# Patient Record
Sex: MALE | Race: WHITE | Employment: FULL TIME | URBAN - METROPOLITAN AREA
[De-identification: names, ages, dates, MRNs, and addresses within clinical notes are randomized per-mention and may not be internally consistent; named-entity substitution may affect disease eponyms.]

---

## 2017-01-04 ENCOUNTER — TELEPHONE ANTICOAG (OUTPATIENT)
Dept: CARDIOLOGY CLINIC | Age: 52
End: 2017-01-04

## 2017-01-04 LAB — INR, EXTERNAL: 1.8

## 2017-01-04 NOTE — PROGRESS NOTES
Verbal order and read back per Bobbi Eduardo NP  The INR is below the therapeutic range.   Please make the following adjustments to Coumadin dosin.5 mg extra today and then continue 2.5 mg daily except 5 mg MWMARICARMEN  Repeat the INR in 1 month

## 2017-01-06 ENCOUNTER — HOSPITAL ENCOUNTER (OUTPATIENT)
Dept: LAB | Age: 52
Discharge: HOME | End: 2017-01-06
Payer: COMMERCIAL

## 2017-01-06 ENCOUNTER — OFFICE VISIT (OUTPATIENT)
Dept: CARDIOLOGY CLINIC | Age: 52
End: 2017-01-06

## 2017-01-06 VITALS
DIASTOLIC BLOOD PRESSURE: 70 MMHG | BODY MASS INDEX: 40.46 KG/M2 | HEIGHT: 71 IN | OXYGEN SATURATION: 97 % | SYSTOLIC BLOOD PRESSURE: 132 MMHG | HEART RATE: 68 BPM | WEIGHT: 289 LBS

## 2017-01-06 DIAGNOSIS — I35.9 AORTIC VALVE DISEASE: ICD-10-CM

## 2017-01-06 DIAGNOSIS — I25.10 CORONARY ARTERY DISEASE INVOLVING NATIVE CORONARY ARTERY OF NATIVE HEART WITHOUT ANGINA PECTORIS: Primary | Chronic | ICD-10-CM

## 2017-01-06 DIAGNOSIS — E78.49 OTHER HYPERLIPIDEMIA: ICD-10-CM

## 2017-01-06 DIAGNOSIS — I44.0 FIRST DEGREE AV BLOCK: ICD-10-CM

## 2017-01-06 LAB
ALBUMIN SERPL BCP-MCNC: 3.9 G/DL (ref 3.4–5)
ALBUMIN/GLOB SERPL: 1.2 {RATIO} (ref 0.8–1.7)
ALP SERPL-CCNC: 69 U/L (ref 45–117)
ALT SERPL-CCNC: 50 U/L (ref 16–61)
ANION GAP BLD CALC-SCNC: 8 MMOL/L (ref 3–18)
AST SERPL W P-5'-P-CCNC: 53 U/L (ref 15–37)
BASOPHILS # BLD AUTO: 0 K/UL (ref 0–0.1)
BASOPHILS # BLD: 0 % (ref 0–2)
BILIRUB SERPL-MCNC: 0.4 MG/DL (ref 0.2–1)
BUN SERPL-MCNC: 20 MG/DL (ref 7–18)
BUN/CREAT SERPL: 21 (ref 12–20)
CALCIUM SERPL-MCNC: 9 MG/DL (ref 8.5–10.1)
CHLORIDE SERPL-SCNC: 104 MMOL/L (ref 100–108)
CHOLEST SERPL-MCNC: 228 MG/DL
CO2 SERPL-SCNC: 26 MMOL/L (ref 21–32)
CREAT SERPL-MCNC: 0.94 MG/DL (ref 0.6–1.3)
DIFFERENTIAL METHOD BLD: ABNORMAL
EOSINOPHIL # BLD: 0.3 K/UL (ref 0–0.4)
EOSINOPHIL NFR BLD: 6 % (ref 0–5)
ERYTHROCYTE [DISTWIDTH] IN BLOOD BY AUTOMATED COUNT: 14.6 % (ref 11.6–14.5)
GLOBULIN SER CALC-MCNC: 3.3 G/DL (ref 2–4)
GLUCOSE SERPL-MCNC: 87 MG/DL (ref 74–99)
HCT VFR BLD AUTO: 42.9 % (ref 36–48)
HDLC SERPL-MCNC: 46 MG/DL (ref 40–60)
HDLC SERPL: 5 {RATIO} (ref 0–5)
HGB BLD-MCNC: 14.1 G/DL (ref 13–16)
LDLC SERPL CALC-MCNC: 144.2 MG/DL (ref 0–100)
LIPID PROFILE,FLP: ABNORMAL
LYMPHOCYTES # BLD AUTO: 43 % (ref 21–52)
LYMPHOCYTES # BLD: 2.5 K/UL (ref 0.9–3.6)
MCH RBC QN AUTO: 29.1 PG (ref 24–34)
MCHC RBC AUTO-ENTMCNC: 32.9 G/DL (ref 31–37)
MCV RBC AUTO: 88.6 FL (ref 74–97)
MONOCYTES # BLD: 0.3 K/UL (ref 0.05–1.2)
MONOCYTES NFR BLD AUTO: 6 % (ref 3–10)
NEUTS SEG # BLD: 2.7 K/UL (ref 1.8–8)
NEUTS SEG NFR BLD AUTO: 45 % (ref 40–73)
PLATELET # BLD AUTO: 226 K/UL (ref 135–420)
PMV BLD AUTO: 10.1 FL (ref 9.2–11.8)
POTASSIUM SERPL-SCNC: 4.2 MMOL/L (ref 3.5–5.5)
PROT SERPL-MCNC: 7.2 G/DL (ref 6.4–8.2)
RBC # BLD AUTO: 4.84 M/UL (ref 4.7–5.5)
SODIUM SERPL-SCNC: 138 MMOL/L (ref 136–145)
T4 FREE SERPL-MCNC: 1.1 NG/DL (ref 0.7–1.5)
TRIGL SERPL-MCNC: 189 MG/DL (ref ?–150)
TSH SERPL DL<=0.05 MIU/L-ACNC: 2.19 UIU/ML (ref 0.36–3.74)
VLDLC SERPL CALC-MCNC: 37.8 MG/DL
WBC # BLD AUTO: 6 K/UL (ref 4.6–13.2)

## 2017-01-06 PROCEDURE — 85025 COMPLETE CBC W/AUTO DIFF WBC: CPT

## 2017-01-06 PROCEDURE — 84439 ASSAY OF FREE THYROXINE: CPT

## 2017-01-06 PROCEDURE — 80061 LIPID PANEL: CPT

## 2017-01-06 PROCEDURE — 80053 COMPREHEN METABOLIC PANEL: CPT

## 2017-01-06 PROCEDURE — 36415 COLL VENOUS BLD VENIPUNCTURE: CPT

## 2017-01-06 RX ORDER — ALBUTEROL SULFATE 90 UG/1
AEROSOL, METERED RESPIRATORY (INHALATION)
COMMUNITY

## 2017-01-06 NOTE — PROGRESS NOTES
1. Have you been to the ER, urgent care clinic since your last visit? Hospitalized since your last visit? No  2. Have you seen or consulted any other health care providers outside of the [address] [place] [address] since your last visit? Include any pap smears or colon screening.   NO

## 2017-01-06 NOTE — MR AVS SNAPSHOT
Visit Information Date & Time Provider Department Dept. Phone Encounter #  
 1/6/2017  3:20 PM Jennifer Chandler MD [facility] [id number] Your Appointments 11/7/2017  4:00 PM  
Follow Up with Kayla Bledsoe MD  
[facility] ([facility]) Appt Note: 1 yr f/up per Tono [id number] [address] [id number] [phone] [address] Upcoming Health Maintenance Date Due Hepatitis C Screening 1965 DTaP/Tdap/Td series (1 - Tdap) 11/29/1985 FOBT Q 1 YEAR AGE 50-75 11/29/2014 INFLUENZA AGE 9 TO ADULT 8/1/2016 Allergies as of 1/6/2017  Review Complete On: 1/6/2017 By: Gabino Postal Severity Noted Reaction Type Reactions Hydromorphone  11/13/2014    Other (comments) Makes anxious and  Amplifies pain  
 Nsaids (Non-steroidal Anti-inflammatory Drug)  11/13/2014    Other (comments) Cannot take because he has a heart valve Current Immunizations  Never Reviewed Name Date Pneumococcal Polysaccharide (PPSV-23) 6/4/2014  8:41 AM  
  
 Not reviewed this visit You Were Diagnosed With   
  
 Codes Comments Coronary artery disease involving native coronary artery of native heart without angina pectoris    -  Primary ICD-10-CM: I25.10 ICD-9-CM: 414.01 Vitals BP Pulse Height(growth percentile) Weight(growth percentile) SpO2 BMI  
 132/70 68 5' 11\" (1.803 m) 289 lb (131.1 kg) 97% 40.31 kg/m2 Smoking Status Never Smoker Vitals History BMI and BSA Data Body Mass Index Body Surface Area  
 40.31 kg/m 2 2.56 m 2 Preferred Pharmacy Pharmacy Name Phone [id number] [address] [facility] [phone] Your Updated Medication List  
  
   
This list is accurate as of: 1/6/17  4:36 PM.  Always use your most recent med list.  
  
  
  
  
 ADVAIR DISKUS 100-50 mcg/dose diskus inhaler Generic drug:  fluticasone-salmeterol Take 1 Puff by inhalation every twelve (12) hours. 250-50 mcg instructed to use DOS  
  
 albuterol 90 mcg/actuation inhaler Commonly known as:  PROVENTIL HFA, VENTOLIN HFA, PROAIR HFA Take  by inhalation every four (4) hours as needed for Wheezing. allopurinol 300 mg tablet Commonly known as:  [place] Night Take 300 mg by mouth daily. aspirin 81 mg chewable tablet Take 81 mg by mouth daily (with breakfast). colchicine 0.6 mg tablet Take 0.6 mg by mouth two (2) times a day. losartan 50 mg tablet Commonly known as:  COZAAR  
take 1 tablet by mouth daily NASONEX 50 mcg/actuation nasal spray Generic drug:  mometasone 1 [place] by Both Nostrils route daily. pediatric multivitamins chewable tablet Take 1 Tab by mouth two (2) times a day. SYNTHROID 75 mcg tablet Generic drug:  levothyroxine Take 75 mcg by mouth Daily (before breakfast). Instructed to take DOS  
  
 tamsulosin 0.4 mg capsule Commonly known as:  FLOMAX TAKE 1 CAPSULE BY MOUTH ONCE DAILY  
  
 testosterone cypionate 200 mg/mL injection Commonly known as:  DEPOTESTOTERONE CYPIONATE  
200 mg by IntraMUSCular route every twenty-one (21) days. warfarin 5 mg tablet Commonly known as:  COUMADIN  
TAKE 1 TABLET BY MOUTH EVERY DAY OR AS DIRECTED BY OUR OFFICE We Performed the Following AMB POC EKG ROUTINE W/ 12 LEADS, INTER & REP [12938 CPT(R)] To-Do List   
 01/06/2017 Lab:  CBC WITH AUTOMATED DIFF   
  
 01/06/2017 Lab:  LIPID PANEL   
  
 01/06/2017 Lab:  METABOLIC PANEL, COMPREHENSIVE   
  
 01/06/2017 Lab:  T4   
  
 01/06/2017 Lab:  TSH 3RD GENERATION Introducing [facility]!    
 Irena Aly introduces [employer] patient portal. Now you can access parts of your medical record, email your doctor's office, and request medication refills online. 1. In your internet browser, go to [email or web address] 2. Click on the First Time User? Click Here link in the Sign In box. You will see the New Member Sign Up page. 3. Enter your [employer] Access Code exactly as it appears below. You will not need to use this code after youve completed the sign-up process. If you do not sign up before the expiration date, you must request a new code. · [employer] Access Code: [email or web address] Expires: 3/21/2017  4:08 PM 
 
4. Enter the last four digits of your Social Security Number (xxxx) and Date of Birth (mm/dd/yyyy) as indicated and click Submit. You will be taken to the next sign-up page. 5. Create a [employer] ID. This will be your [employer] login ID and cannot be changed, so think of one that is secure and easy to remember. 6. Create a [employer] password. You can change your password at any time. 7. Enter your Password Reset Question and Answer. This can be used at a later time if you forget your password. 8. Enter your e-mail address. You will receive e-mail notification when new information is available in [address] 9. Click Sign Up. You can now view and download portions of your medical record. 10. Click the Download Summary menu link to download a portable copy of your medical information. If you have questions, please visit the Frequently Asked Questions section of the [employer] website. Remember, [employer] is NOT to be used for urgent needs. For medical emergencies, dial 911. Now available from your iPhone and Android! Please provide this summary of care documentation to your next provider. Your primary care clinician is listed as [address]. If you have any questions after today's visit, please call [phone].

## 2017-01-07 ENCOUNTER — HOSPITAL ENCOUNTER (EMERGENCY)
Age: 52
Discharge: HOME | End: 2017-01-07
Attending: EMERGENCY MEDICINE
Payer: COMMERCIAL

## 2017-01-07 VITALS
DIASTOLIC BLOOD PRESSURE: 94 MMHG | RESPIRATION RATE: 20 BRPM | WEIGHT: 278 LBS | TEMPERATURE: 98.4 F | HEIGHT: 71 IN | SYSTOLIC BLOOD PRESSURE: 166 MMHG | BODY MASS INDEX: 38.92 KG/M2 | OXYGEN SATURATION: 98 % | HEART RATE: 81 BPM

## 2017-01-07 DIAGNOSIS — G51.0 BELL'S PALSY: Primary | ICD-10-CM

## 2017-01-07 PROCEDURE — 74011636637 HC RX REV CODE- 636/637: Performed by: EMERGENCY MEDICINE

## 2017-01-07 PROCEDURE — 99283 EMERGENCY DEPT VISIT LOW MDM: CPT

## 2017-01-07 RX ORDER — PREDNISONE 20 MG/1
20 TABLET ORAL DAILY
Qty: 5 TAB | Refills: 0 | Status: SHIPPED | OUTPATIENT
Start: 2017-01-07 | End: 2017-01-12

## 2017-01-07 RX ORDER — PREDNISONE 20 MG/1
60 TABLET ORAL
Status: COMPLETED | OUTPATIENT
Start: 2017-01-07 | End: 2017-01-07

## 2017-01-07 RX ADMIN — PREDNISONE 60 MG: 20 TABLET ORAL at 13:00

## 2017-01-07 NOTE — DISCHARGE INSTRUCTIONS
Bell's Palsy: Care Instructions  Your Care Instructions    Bell's palsy is paralysis or weakness of the muscles on one side of the face. Often people with Bell's palsy have a droop on one side of the mouth and have trouble completely shutting the eye on the same side. Bell's palsy can also interfere with the sense of taste. These things happen when a nerve in your face becomes inflamed. Bell's palsy is not caused by a stroke. The cause of the nerve inflammation is not known. But some experts think that a virus may cause it. Because of this, doctors sometimes prescribe antiviral medicine to treat it. You also may get medicine to reduce swelling. Bell's palsy usually gets better on its own in a few weeks or months. Follow-up care is a key part of your treatment and safety. Be sure to make and go to all appointments, and call your doctor if you are having problems. It's also a good idea to know your test results and keep a list of the medicines you take. How can you care for yourself at home? · Take your medicines exactly as prescribed. Call your doctor if you think you are having a problem with your medicine. You will get more details on the specific medicines your doctor prescribes. · Use artificial tears or ointment if your eyes are too dry. Bell's palsy can make your lower eyelid droop, causing a dry eye. · If you cannot completely close your eye, consider using an eye patch while you sleep. · Help yourself blink by using your finger to close and open your eyelid. This may help keep your eye moist.  · Wear glasses or goggles to keep dust and dirt out of your eye. · As feeling comes back to your face, massage your forehead, cheeks, and lips. Massage may make the muscles in your face stronger. · Brush and floss your teeth often to help prevent tooth decay. Bell's palsy can dry up the spit on one side of your mouth. This increases the risk of tooth decay. When should you call for help?   Call 911 anytime you think you may need emergency care. For example, call if:  · You have symptoms of a stroke. These may include:  ¨ Sudden numbness, tingling, weakness, or loss of movement in your face, arm, or leg, especially on only one side of your body. ¨ Sudden vision changes. ¨ Sudden trouble speaking. ¨ Sudden confusion or trouble understanding simple statements. ¨ Sudden problems with walking or balance. ¨ A sudden, severe headache that is different from past headaches. Call your doctor now or seek immediate medical care if:  · You have numbness or weakness that spreads beyond one side of your face. · You have a skin rash or eye pain or redness, or light bothers your eyes. · You have a new or worse headache. Watch closely for changes in your health, and be sure to contact your doctor if:  · You do not get better as expected. Where can you learn more? Go to http://teodora-bennie.info/. Enter P168 in the search box to learn more about \"Bell's Palsy: Care Instructions. \"  Current as of: February 19, 2016  Content Version: 11.1  © 9387-1292 [employer]. Care instructions adapted under license by [employer] (which disclaims liability or warranty for this information). If you have questions about a medical condition or this instruction, always ask your healthcare professional. [address] any warranty or liability for your use of this information.

## 2017-01-07 NOTE — ED TRIAGE NOTES
Onset two days ago of left facial droop and some ear pain. Noted to have mouth droop at arrival. Denies other weakness.

## 2017-01-07 NOTE — ED NOTES
Pt states ready for discharge. Pt states he will follow up with PCP as instructed by provider. Pt appears in NOAD. I have reviewed discharge instructions with the patient. Prescriptions were reviewed with patient instructed not to drink alcohol, drive a car, or operate heavy machinery while taking this medicine. The patient verbalized understanding. Patient seen leaving ED ambulatory without difficulty or need for assistance, with S/O in no sign of distress. Patient armband removed and shredded. Discharge Medication List as of 1/7/2017  1:05 PM      START taking these medications    Details   predniSONE (DELTASONE) 20 mg tablet Take 1 Tab by mouth daily for 5 days. , Print, Disp-5 Tab, R-0         CONTINUE these medications which have NOT CHANGED    Details   albuterol (PROVENTIL HFA, VENTOLIN HFA, PROAIR HFA) 90 mcg/actuation inhaler Take  by inhalation every four (4) hours as needed for Wheezing., Historical Med      tamsulosin (FLOMAX) 0.4 mg capsule TAKE 1 CAPSULE BY MOUTH ONCE DAILY, Normal, Disp-90 Cap, R-3      warfarin (COUMADIN) 5 mg tablet TAKE 1 TABLET BY MOUTH EVERY DAY OR AS DIRECTED BY OUR OFFICE, Normal**Patient requests 90 days supply**Disp-135 Tab, R-1      losartan (COZAAR) 50 mg tablet take 1 tablet by mouth daily, NormalPatient needs a small supply until his mail order arrivesDisp-10 Tab, R-1      pediatric multivitamins chewable tablet Take 1 Tab by mouth two (2) times a day., Historical Med      aspirin 81 mg chewable tablet Take 81 mg by mouth daily (with breakfast). , Historical Med      allopurinol (ZYLOPRIM) 300 mg tablet Take 300 mg by mouth daily. , Historical Med      mometasone (NASONEX) 50 mcg/actuation nasal spray 1 [place] by Both Nostrils route daily. , Historical Med      testosterone cypionate (DEPOTESTOTERONE CYPIONATE) 200 mg/mL injection 200 mg by IntraMUSCular route every twenty-one (21) days. , Historical Med      fluticasone-salmeterol (ADVAIR DISKUS) 100-50 mcg/dose diskus inhaler Take 1 Puff by inhalation every twelve (12) hours. 250-50 mcg instructed to use DOS, Historical Med      levothyroxine (SYNTHROID) 75 mcg tablet Take 75 mcg by mouth Daily (before breakfast).  Instructed to take DOS, Historical Med      colchicine 0.6 mg tablet Take 0.6 mg by mouth two (2) times a day., Historical Med

## 2017-01-07 NOTE — ED PROVIDER NOTES
HPI Comments: 12:47 PM Riley Delgadillo is a 45 y.o. male with h/o HTN, CAD, CABG X 1 and gastric bypass who presents to ED complaining of L sided facial numbness and weakness onset two days ago. He states \"The L side of my mouth was numb two days ago. The numbness moved to under my nose and L cheek yesterday. \" Wife notes a L sided facial droop. Pt also complains of bilateral ear pain. Pt denies any CP, SOB or NV. No other concerns nor complaints at this time. PCP: Bogdan Shetty MD      The history is provided by the patient and the spouse. Past Medical History:   Diagnosis Date    Allergic asthma     Allergic rhinitis     Anticoagulated on Coumadin     CABG x 1 10/31/2007     SVG to OM and aortic valve replacement by Dr. Toney BLACKMAN (coronary artery disease)     Cardiac cath 08/24/2007     mRCA 30%. LM patent. LAD patent. mCX 45%. LVEDP 40.  EF 35%. LVE. Global hypk. 2+ AI. RA 11.  RV 44/8. PA 45/22. W 34.  CO/CI 7.4/3.0 (TD); 6.8/2.7 ([facility]).  Cardiac echocardiogram 07/15/2014     EF normal.  Septal WMA c/w past CABG. LVH. Mild LAE. Mild JUVENAL. Prosthetic AV w/normal function (mean grad 11 mmHg).  Cardiac nuclear imaging test, mod risk 04/14/2014     Patchy radiotracer uptake. Sm, fixed basal inferior defect most c/w artifact. EF 43%. Mild septal hypk c/w past CABG. Mild mid anterior hypk. Neg EKG on pharm stress test.  Intermediate risk.  Difficulty in voiding      Dr. Erlinda Conde; On Tamsulosin    Gout     History of aortic valve disease     Hypertension     Hypogonadism, male      Dr. Erlinda Conde; On TRT via IM injection of testosterone 200 mg every 3 weeks       Hypothyroidism     Left ventricular dilatation      with lower limits of normal left ventricular systolic function.     Obesity, Class II, BMI 35-39.9 (HCC)     Obstructive sleep apnea on CPAP     Osteoarthritis of both knees 6/1/2014    Postoperative anemia due to acute blood loss 6/2/2014    S/P aortic valve replacement 10/31/2007     Dr. Shruthi Baker S/P gastric bypass 4/2009     S/P Gastric bypass (4/2009 - Dr. Josiane Anand)    Vitamin D deficiency 6/6/2014     Vitamin D 25-Hydroxy 19.9       Past Surgical History:   Procedure Laterality Date    Pr gastric bypass,obesity,w/sm bowel recons  4/2009     Dr. Josiane Anand; 345 pounds with target weight of 200 pounds.  Hx aortic valve replacement  10/31/2007     Dr. Shruthi Baker Hx heent       S/P Rhinoplasty    Hx lumbar laminectomy  1990    Hx gastric bypass  2009    Hx knee arthroscopy Right      2x    Hx knee replacement Bilateral 6/02/2014     S/P Bilateral total knee replacement (6/02/2014 - Dr. Val Christian)    Hx coronary artery bypass graft  10/31/2007     Dr. Nasim Anders (SVG to OM)         Family History:   Problem Relation Age of Onset    Cancer Mother      Lung cancer    Heart Disease Mother     Lung Disease Mother     Depression Mother     High Cholesterol Mother     Heart Disease Father     High Cholesterol Father        Social History     Social History    Marital status:      Spouse name: N/A    Number of children: N/A    Years of education: N/A     Occupational History    Not on file. Social History Main Topics    Smoking status: Never Smoker    Smokeless tobacco: Never Used    Alcohol use No    Drug use: Not on file    Sexual activity: Not on file     Other Topics Concern    Not on file     Social History Narrative         ALLERGIES: Hydromorphone and Nsaids (non-steroidal anti-inflammatory drug)    Review of Systems   Constitutional: Negative for chills, fatigue, fever and unexpected weight change. HENT: Positive for ear pain (bilateral). Negative for congestion and rhinorrhea. Respiratory: Negative for chest tightness and shortness of breath. Cardiovascular: Negative for chest pain, palpitations and leg swelling.    Gastrointestinal: Negative for abdominal pain, nausea and vomiting. Genitourinary: Negative for dysuria. Musculoskeletal: Negative for back pain. Skin: Negative for rash. Neurological: Positive for facial asymmetry (L facial), weakness (L facial) and numbness (L facial). Negative for dizziness. Psychiatric/Behavioral: The patient is not nervous/anxious. All other systems reviewed and are negative. Vitals:    01/07/17 1245 01/07/17 1248   BP:  (!) 166/94   Pulse: 81    Resp: 20    Temp: 98.4 °F (36.9 °C)    SpO2: 98%    Weight:  126.1 kg (278 lb)   Height:  5' 11\" (1.803 m)            Physical Exam   Constitutional: He is oriented to person, place, and time. He appears well-developed and well-nourished. No distress. HENT:   Head: Normocephalic and atraumatic. Right Ear: External ear normal.   Left Ear: External ear normal.   Nose: Nose normal.   Mouth/Throat: Oropharynx is clear and moist.   Eyes: Conjunctivae and EOM are normal. Pupils are equal, round, and reactive to light. No scleral icterus. Neck: Normal range of motion. Neck supple. No JVD present. No tracheal deviation present. No thyromegaly present. Cardiovascular: Normal rate, regular rhythm, normal heart sounds and intact distal pulses. Exam reveals no gallop and no friction rub. No murmur heard. Pulmonary/Chest: Effort normal and breath sounds normal. He exhibits no tenderness. Abdominal: Soft. Bowel sounds are normal. He exhibits no distension. There is no tenderness. There is no rebound and no guarding. Musculoskeletal: Normal range of motion. He exhibits no edema or tenderness. Lymphadenopathy:     He has no cervical adenopathy. Neurological: He is alert and oriented to person, place, and time. A cranial nerve deficit is present. Coordination normal.   L facial nerve palsy that includes the forehead. C/W peripheral nerve compression. Skin: Skin is warm and dry. Psychiatric: He has a normal mood and affect.  His behavior is normal. Judgment and thought content normal.   Nursing note and vitals reviewed. [facility]  ED Course       Procedures  Vitals:  Patient Vitals for the past 12 hrs:   Temp Pulse Resp BP SpO2   01/07/17 1248 - - - (!) 166/94 -   01/07/17 1245 98.4 °F (36.9 °C) 81 20 - 98 %         Medications ordered:   Medications - No data to display      Lab findings:  No results found for this or any previous visit (from the past 12 hour(s)). Disposition:  Diagnosis: No diagnosis found. Disposition: discharged     Follow-up Information     None           Patient's Medications   Start Taking    No medications on file   Continue Taking    ALBUTEROL (PROVENTIL HFA, VENTOLIN HFA, PROAIR HFA) 90 MCG/ACTUATION INHALER    Take  by inhalation every four (4) hours as needed for Wheezing. ALLOPURINOL (ZYLOPRIM) 300 MG TABLET    Take 300 mg by mouth daily. ASPIRIN 81 MG CHEWABLE TABLET    Take 81 mg by mouth daily (with breakfast). COLCHICINE 0.6 MG TABLET    Take 0.6 mg by mouth two (2) times a day. FLUTICASONE-SALMETEROL (ADVAIR DISKUS) 100-50 MCG/DOSE DISKUS INHALER    Take 1 Puff by inhalation every twelve (12) hours. 250-50 mcg instructed to use DOS    LEVOTHYROXINE (SYNTHROID) 75 MCG TABLET    Take 75 mcg by mouth Daily (before breakfast). Instructed to take DOS    LOSARTAN (COZAAR) 50 MG TABLET    take 1 tablet by mouth daily    MOMETASONE (NASONEX) 50 MCG/ACTUATION NASAL SPRAY    1 [place] by Both Nostrils route daily. PEDIATRIC MULTIVITAMINS CHEWABLE TABLET    Take 1 Tab by mouth two (2) times a day. TAMSULOSIN (FLOMAX) 0.4 MG CAPSULE    TAKE 1 CAPSULE BY MOUTH ONCE DAILY    TESTOSTERONE CYPIONATE (DEPOTESTOTERONE CYPIONATE) 200 MG/ML INJECTION    200 mg by IntraMUSCular route every twenty-one (21) days.     WARFARIN (COUMADIN) 5 MG TABLET    TAKE 1 TABLET BY MOUTH EVERY DAY OR AS DIRECTED BY OUR OFFICE   These Medications have changed    No medications on file   Stop Taking    No medications on file     Scribe Attestation  Laney Bauman scribing for and in the presence of Chacho Garcia MD (01/07/17/ 12:46 PM)    Physician Attestation  I personally performed the services described in this documentation, reviewed, and edited the documentation which was dictated to the scribe in my presence, and it accurately records my own words and actions.      Chacho Garcia MD (01/07/17/ 12:46 PM)    Signed by: Clotilde Sullivan, 01/07/17, 12:46 PM

## 2017-01-09 NOTE — PROGRESS NOTES
PATIENT NAME: Mirza Bell         45 y.o.      1965              DATE:1/6/2017    REASON FOR VISIT:aortic valve replacement. Coronary artery disease. HISTORY OF PRESENT ILLNESS:Status post aortic valve replacement. Mechanical valve. Chronic anticoagulation. Coronary artery bypass surgery. History of hypertension. Blood pressure is controlled. Hyperlipidemia. Lipids have not been tested. He denies chest pain, dyspnea on exertion, orthopnea, paroxysmal nocturnal dyspnea. Denies palpitation, syncope, presyncope. PAST MEDICAL HISTORY:   Past Medical History: Allergic asthma                                               Allergic rhinitis                                             Anticoagulated on Coumadin                                    CABG x 1                                        10/31/2007      Comment:SVG to OM and aortic valve replacement by Dr. Liliya Moy    CAD (coronary artery disease)                                 Cardiac cath                                    08/24/2007      Comment:mRCA 30%. LM patent. LAD patent. mCX 45%. LVEDP 40.  EF 35%. LVE. Global hypk. 2+ AI. RA 11.  RV 44/8. PA 45/22. W 34.  CO/CI                7.4/3.0 (TD); 6.8/2.7 ([facility]). Cardiac echocardiogram                          07/15/2014      Comment:EF normal.  Septal WMA c/w past CABG. LVH. Mild LAE. Mild JUVENAL. Prosthetic AV w/normal                function (mean grad 11 mmHg). Cardiac nuclear imaging test, mod risk          04/14/2014      Comment:Patchy radiotracer uptake. Sm, fixed basal                inferior defect most c/w artifact. EF 43%. Mild septal hypk c/w past CABG. Mild mid                anterior hypk. Neg EKG on pharm stress test.                 Intermediate risk. Difficulty in voiding                                           Comment:Dr. Lulú Ontiveros;  On Tamsulosin Gout                                                          History of aortic valve disease                               Hypertension                                                  Hypogonadism, male                                              Comment:Dr. Katelynn Dhillon; On TRT via IM injection of                testosterone 200 mg every 3 weeks       Hypothyroidism                                                Left ventricular dilatation                                     Comment:with lower limits of normal left ventricular                systolic function. Obesity, Class II, BMI 35-39.9 (HCC)                          Obstructive sleep apnea on CPAP                               Osteoarthritis of both knees                    6/1/2014      Postoperative anemia due to acute blood loss    6/2/2014      S/P aortic valve replacement                    10/31/2007      Comment:Dr. Francheska Greenfield    S/P gastric bypass                              4/2009          Comment:S/P Gastric bypass (4/2009 - Dr. Ru Bean)    Vitamin D deficiency                            6/6/2014        Comment:Vitamin D 25-Hydroxy 19.9    PAST SURGICAL HISTORY:   Past Surgical History:    DE GASTRIC BYPASS,OBESITY,W/SM BOWEL RECONS      4/2009          Comment:Dr. Ru Bean; 345 pounds with target                weight of 200 pounds.     HX AORTIC VALVE REPLACEMENT                      10/31/2007      Comment:Dr. Francheska Greenfield    HX HEENT                                                         Comment:S/P Rhinoplasty    HX LUMBAR LAMINECTOMY                            1990          HX GASTRIC BYPASS                                2009          HX KNEE ARTHROSCOPY                             Right                 Comment:2x    HX KNEE REPLACEMENT                             Bilateral 6/02/2014       Comment:S/P Bilateral total knee replacement (6/02/2014               - Dr. Katie Castro)    HX CORONARY ARTERY BYPASS GRAFT 10/31/2007      Comment:Dr. Uyen Kowalski (SVG to OM)      SOCIAL HISTORY:  Social History    Marital status:              Spouse name:                       Years of education:                 Number of children:               Social History Main Topics    Smoking status: Never Smoker                                                                Smokeless status: Never Used                        Alcohol use: No                ALLERGIES:    -- Hydromorphone -- Other (comments)    --  Makes anxious and  Amplifies pain   -- Nsaids (Non-Steroidal Anti-Inflammatory Drug) -- Other (comments)    --  Cannot take because he has a heart valve     CURRENT MEDICATIONS:   Current Outpatient Prescriptions:  albuterol (PROVENTIL HFA, VENTOLIN HFA, PROAIR HFA) 90 mcg/actuation inhaler, Take  by inhalation every four (4) hours as needed for Wheezing. warfarin (COUMADIN) 5 mg tablet, TAKE 1 TABLET BY MOUTH EVERY DAY OR AS DIRECTED BY OUR OFFICE  losartan (COZAAR) 50 mg tablet, take 1 tablet by mouth daily  aspirin 81 mg chewable tablet, Take 81 mg by mouth daily (with breakfast). allopurinol (ZYLOPRIM) 300 mg tablet, Take 300 mg by mouth daily. levothyroxine (SYNTHROID) 75 mcg tablet, Take 75 mcg by mouth Daily (before breakfast). Instructed to take DOS  colchicine 0.6 mg tablet, Take 0.6 mg by mouth two (2) times a day. predniSONE (DELTASONE) 20 mg tablet, Take 1 Tab by mouth daily for 5 days. tamsulosin (FLOMAX) 0.4 mg capsule, TAKE 1 CAPSULE BY MOUTH ONCE DAILY  pediatric multivitamins chewable tablet, Take 1 Tab by mouth two (2) times a day. mometasone (NASONEX) 50 mcg/actuation nasal spray, 1 [place] by Both Nostrils route daily. testosterone cypionate (DEPOTESTOTERONE CYPIONATE) 200 mg/mL injection, 200 mg by IntraMUSCular route every twenty-one (21) days. fluticasone-salmeterol (ADVAIR DISKUS) 100-50 mcg/dose diskus inhaler, Take 1 Puff by inhalation every twelve (12) hours.  250-50 mcg instructed to use DOS    No current facility-administered medications for this visit. REVIEW of SYSTEMS:History obtained from chart review and the patient  General ROS: 39 pounds weight gain since 2/16  Psychological ROS: negative for - anxiety or depression  Hematological and Lymphatic ROS: negative for - bleeding problems  Respiratory ROS: Please see history of present illness  Cardiovascular ROS: Please see history of present illness  Gastrointestinal ROS: no abdominal pain, change in bowel habits, or black or bloody stools  Neurological ROS: no TIA or stroke symptoms     PHYSICAL EXAMINATION:   /70  Pulse 68  Ht 5' 11\" (1.803 m)  Wt 131.1 kg (289 lb)  SpO2 97%  BMI 40.31 kg/m2  BP Readings from Last 3 Encounters:  01/07/17 : (!) 166/94  01/06/17 : 132/70  02/27/16 : 149/79    Pulse Readings from Last 3 Encounters:  01/07/17 : 81  01/06/17 : 68  02/27/16 : 92    Wt Readings from Last 3 Encounters:  01/07/17 : 126.1 kg (278 lb)  01/06/17 : 131.1 kg (289 lb)  02/27/16 : 113.4 kg (250 lb)    Gen. : Obese white male NAD. HEENT: Sclera clear. Mucous membranes pink and moist.  Neck: No jugular venous distention. Carotid upstrokes 2+ without bruits. Chest: Clear to percussion and auscultation. Heart: PMI not palpable. Regular rhythm. Good valve sounds. Faint systolic ejection murmur at the base. No gallop. Abdomen: Nontender without masses or organomegaly. Extremities: No edema. Dorsalis pedis and posterior tibial pulses 2+. Skin: Warm and dry. No stasis changes. Neuro: Alert, oriented, speech WNL, no facial asymmetry. Gait WNL. EKG: First degree AV block    IMPRESSION:   Status post aortic valve replacement, asymptomatic.   Coronary artery disease, status post coronary artery bypass surgery, asymptomatic  Hyperlipidemia  Hypertension  Obesity  First degree AV block    PLAN:  Lipid profile  Comprehensive metabolic profile  TSH  CBC  Return to office in one year    The diagnoses and plan were discussed with patient. All questions answered. Plan of care agreed to by all concerned. Isauro Almazan.  MD Nieves       ,

## 2017-01-22 NOTE — PROGRESS NOTES
His LDL cholesterol is too high. He really should be taking a statin if he can tolerate it. Has he had any experience with cholesterol-lowering medications?   Please find out and let me know

## 2017-01-23 ENCOUNTER — TELEPHONE (OUTPATIENT)
Dept: CARDIOLOGY CLINIC | Age: 52
End: 2017-01-23

## 2017-01-23 NOTE — TELEPHONE ENCOUNTER
----- Message from Elma Almaraz MD sent at 1/22/2017  2:15 PM EST -----  His LDL cholesterol is too high. He really should be taking a statin if he can tolerate it. Has he had any experience with cholesterol-lowering medications?   Please find out and let me know

## 2017-01-23 NOTE — TELEPHONE ENCOUNTER
This has been fully explained to the patient, who indicates understanding. Patient open to medication for cholesterol, will follow up with PCP and Dr. Marianne Maldonado for further evaluation.

## 2017-02-02 ENCOUNTER — TELEPHONE (OUTPATIENT)
Dept: CARDIOLOGY CLINIC | Age: 52
End: 2017-02-02

## 2017-02-02 NOTE — TELEPHONE ENCOUNTER
Received a notice from [facility] that patient is past due for INR testing. Patient was contacted via phone and asked to perform testing. Patient states he contacted [employer] yesterday to inform the company that he is experiencing an error code on the machine. He is waiting for technical support to contact him or mail another machine. Patient will call the office when he get home from work today with further information.  Yannick Omalley LPN

## 2017-02-17 RX ORDER — LOSARTAN POTASSIUM 50 MG/1
TABLET ORAL
Qty: 10 TAB | Refills: 1 | Status: SHIPPED | OUTPATIENT
Start: 2017-02-17 | End: 2018-04-05 | Stop reason: SDUPTHER

## 2017-02-20 LAB — INR, EXTERNAL: 2

## 2017-02-23 ENCOUNTER — TELEPHONE ANTICOAG (OUTPATIENT)
Dept: CARDIOLOGY CLINIC | Age: 52
End: 2017-02-23

## 2017-02-23 DIAGNOSIS — Z79.01 LONG TERM CURRENT USE OF ANTICOAGULANT THERAPY: ICD-10-CM

## 2017-02-23 DIAGNOSIS — Z95.2 S/P AORTIC VALVE REPLACEMENT: ICD-10-CM

## 2017-02-23 NOTE — PROGRESS NOTES
Verbal order and read back per Andree Sweeney NP  The INR is stable and therapeutic.  Continue same dose of coumadin and recheck in 1 week  Continue Coumadin 2.5mg daily except 5mg on Mon, Wed, and Fri (only call with dose adjustments)  Gabriela Colvin LPN

## 2017-03-23 RX ORDER — LOSARTAN POTASSIUM 50 MG/1
TABLET ORAL
Qty: 90 TAB | Refills: 3 | OUTPATIENT
Start: 2017-03-23

## 2017-04-04 LAB — INR, EXTERNAL: 2.2

## 2017-04-06 ENCOUNTER — TELEPHONE ANTICOAG (OUTPATIENT)
Dept: CARDIOLOGY CLINIC | Age: 52
End: 2017-04-06

## 2017-04-06 DIAGNOSIS — Z95.2 S/P AORTIC VALVE REPLACEMENT: ICD-10-CM

## 2017-04-06 DIAGNOSIS — Z79.01 LONG TERM CURRENT USE OF ANTICOAGULANT THERAPY: ICD-10-CM

## 2017-04-06 NOTE — PROGRESS NOTES
Verbal order and read back per Frederick Banda NP  The INR is stable and therapeutic.  Continue same dose of coumadin and recheck in 1 week  Continue Coumadin 2.5mg daily except 5mg on Mon, Wed, and Fri (only call with dose adjustments)  Rohini Phipps LPN

## 2017-04-16 LAB — INR, EXTERNAL: 2.2

## 2017-04-18 ENCOUNTER — TELEPHONE ANTICOAG (OUTPATIENT)
Dept: CARDIOLOGY CLINIC | Age: 52
End: 2017-04-18

## 2017-04-18 DIAGNOSIS — Z79.01 LONG TERM CURRENT USE OF ANTICOAGULANT THERAPY: ICD-10-CM

## 2017-04-18 DIAGNOSIS — Z95.2 S/P AORTIC VALVE REPLACEMENT: ICD-10-CM

## 2017-04-18 NOTE — PROGRESS NOTES
Verbal order and read back per Sara Morales NP  The INR is stable and therapeutic.  Continue same dose of coumadin and recheck in 1 week  Continue Coumadin 2.5mg daily except 5mg on Mon, Wed, and Fri (only call with dose adjustments)  Patient informed of instructions, read back and verbalized understanding Mera Kearney LPN

## 2017-05-09 ENCOUNTER — TELEPHONE ANTICOAG (OUTPATIENT)
Dept: CARDIOLOGY CLINIC | Age: 52
End: 2017-05-09

## 2017-05-09 DIAGNOSIS — Z79.01 LONG TERM CURRENT USE OF ANTICOAGULANT THERAPY: ICD-10-CM

## 2017-05-09 DIAGNOSIS — Z95.2 S/P AORTIC VALVE REPLACEMENT: ICD-10-CM

## 2017-05-09 LAB — INR, EXTERNAL: 2.3

## 2017-05-09 NOTE — PROGRESS NOTES
Verbal order and read back per Raj Sanchez NP  The INR is stable and therapeutic.  Continue same dose of coumadin and recheck in 1 week  Continue Coumadin 2.5mg daily except 5mg on Mon, Wed, and Fri (only call with dose adjustments)  Kyle Galindo LPN

## 2017-05-23 ENCOUNTER — TELEPHONE ANTICOAG (OUTPATIENT)
Dept: CARDIOLOGY CLINIC | Age: 52
End: 2017-05-23

## 2017-05-23 DIAGNOSIS — Z79.01 LONG TERM CURRENT USE OF ANTICOAGULANT THERAPY: ICD-10-CM

## 2017-05-23 DIAGNOSIS — Z95.2 S/P AORTIC VALVE REPLACEMENT: ICD-10-CM

## 2017-05-23 LAB — INR, EXTERNAL: 2.2

## 2017-05-23 NOTE — PROGRESS NOTES
Verbal order and read back per Nickie Resendez NP  The INR is stable and therapeutic.  Continue same dose of coumadin and recheck in 1 week  Continue Coumadin 2.5mg daily except 5mg on Mon, Wed, and Fri (only call with dose adjustments)  Pdady Mccormick LPN

## 2017-06-15 LAB — INR, EXTERNAL: 2.3

## 2017-06-20 ENCOUNTER — TELEPHONE ANTICOAG (OUTPATIENT)
Dept: CARDIOLOGY CLINIC | Age: 52
End: 2017-06-20

## 2017-06-20 DIAGNOSIS — Z95.2 S/P AORTIC VALVE REPLACEMENT: ICD-10-CM

## 2017-06-20 DIAGNOSIS — Z79.01 LONG TERM CURRENT USE OF ANTICOAGULANT THERAPY: ICD-10-CM

## 2017-06-20 NOTE — PROGRESS NOTES
Verbal order and read back per Dave Cazares NP  The INR is stable and therapeutic.  Continue same dose of coumadin and recheck in 1 week  Continue Coumadin 2.5mg daily except 5mg on Mon, Wed, and Fri (only call with dose adjustments) - insurance requires weekly testing  Mera Kearney LPN

## 2017-06-26 LAB — INR, EXTERNAL: 2.3

## 2017-07-08 LAB
INR, EXTERNAL: 2.3
INR, EXTERNAL: 3.1

## 2017-07-10 ENCOUNTER — TELEPHONE ANTICOAG (OUTPATIENT)
Dept: CARDIOLOGY CLINIC | Age: 52
End: 2017-07-10

## 2017-07-10 DIAGNOSIS — Z95.2 S/P AORTIC VALVE REPLACEMENT: ICD-10-CM

## 2017-07-10 DIAGNOSIS — Z79.01 LONG TERM CURRENT USE OF ANTICOAGULANT THERAPY: ICD-10-CM

## 2017-08-09 LAB — INR, EXTERNAL: 2.4

## 2017-08-10 ENCOUNTER — TELEPHONE ANTICOAG (OUTPATIENT)
Dept: CARDIOLOGY CLINIC | Age: 52
End: 2017-08-10

## 2017-08-10 DIAGNOSIS — Z79.01 LONG TERM CURRENT USE OF ANTICOAGULANT THERAPY: ICD-10-CM

## 2017-08-10 DIAGNOSIS — Z95.2 S/P AORTIC VALVE REPLACEMENT: ICD-10-CM

## 2017-08-10 NOTE — PROGRESS NOTES
Verbal order and read back per Patrick Armendariz NP  The INR is stable and therapeutic.  Continue same dose of coumadin and recheck in 1 week  Continue Coumadin 2.5mg daily except 5mg on Mon, Wed, and Fri (only call with dose adjustments) - insurance requires weekly testing  John Argueta LPN

## 2017-08-23 LAB — INR, EXTERNAL: 2.3

## 2017-08-24 ENCOUNTER — TELEPHONE ANTICOAG (OUTPATIENT)
Dept: CARDIOLOGY CLINIC | Age: 52
End: 2017-08-24

## 2017-08-24 DIAGNOSIS — Z79.01 LONG TERM CURRENT USE OF ANTICOAGULANT THERAPY: ICD-10-CM

## 2017-08-24 DIAGNOSIS — Z95.2 S/P AORTIC VALVE REPLACEMENT: ICD-10-CM

## 2017-08-24 NOTE — PROGRESS NOTES
Verbal order and read back per Alma Aguila NP  The INR is stable and therapeutic.  Continue same dose of coumadin and recheck in 1 week  Continue Coumadin 2.5mg daily except 5mg on Mon, Wed, and Fri (only call with dose adjustments)   Francisco Dorman LPN

## 2017-11-07 ENCOUNTER — OFFICE VISIT (OUTPATIENT)
Dept: CARDIOLOGY CLINIC | Age: 52
End: 2017-11-07

## 2017-11-07 VITALS
HEIGHT: 71 IN | DIASTOLIC BLOOD PRESSURE: 88 MMHG | BODY MASS INDEX: 40.74 KG/M2 | OXYGEN SATURATION: 98 % | HEART RATE: 67 BPM | WEIGHT: 291 LBS | SYSTOLIC BLOOD PRESSURE: 170 MMHG

## 2017-11-07 DIAGNOSIS — Z95.2 S/P AORTIC VALVE REPLACEMENT: ICD-10-CM

## 2017-11-07 DIAGNOSIS — E55.9 VITAMIN D DEFICIENCY: Chronic | ICD-10-CM

## 2017-11-07 DIAGNOSIS — I25.10 CORONARY ARTERY DISEASE INVOLVING NATIVE CORONARY ARTERY OF NATIVE HEART WITHOUT ANGINA PECTORIS: Primary | Chronic | ICD-10-CM

## 2017-11-07 DIAGNOSIS — I10 ESSENTIAL HYPERTENSION: Chronic | ICD-10-CM

## 2017-11-07 DIAGNOSIS — Z79.01 LONG TERM CURRENT USE OF ANTICOAGULANT THERAPY: ICD-10-CM

## 2017-11-07 LAB — INR, EXTERNAL: 2.3

## 2017-11-07 NOTE — PROGRESS NOTES
HISTORY OF PRESENT ILLNESS  Satish Perez is a 45 y.o. male. ASSESSMENT and PLAN    Mr. Bijan Sullivan has history of mechanical aortic valve replacement and single-vessel bypass back in October of 2007 by Dr. Amadou Chavez. He also has history of gastric bypass surgery in April of 2009 with initial weight of 345 pounds. His target was 200 pounds. He did reach 220 pounds. Unfortunately, because of severe bilateral knee osteoarthritis, he has not been able to reach his target; his weight is now 276 pounds. For his bilateral knee replacements in June of 2014, he had preoperative pharmacologic nuclear scan to rule out significant ischemia. This was without significant changes. He tolerated his knee replacement surgery well. In July of 2014, he was admitted to [facility] with intractable vomiting from narcotic withdrawal. His troponin was elevated. He was seen in the emergency room for left sided facial numbness in January 2017. He was diagnosed with Bell's palsy.  CAD:   Symptomatically stable.  BP:   Significantly elevated today. Unfortunately, he has continued to gain weight. I have increased his losartan to 50 mg twice daily. This can be increased to 100 mg twice daily if needed.  HR:    Stable.  CHF:   Currently there is no evidence of decompensated CHF.  Weight:   His weight today is 291 pounds. His weight was down to 220 pounds back in around 2010. Essentially, he has gained about 10 pounds every year for the last 7 years. Again, I had a lengthy discussion with him about the importance of losing weight and controlling his weight. Over 20 minutes spent. He understands.  Cholesterol:   Target LDL <70. Because of his gastric bypass history, he is not able to tolerate statins.  Anti-platelet:   Remains on ASA, and Coumadin for his mechanical aortic valve prosthesis. I will continue to see him back annually. Thank you. Encounter Diagnoses   Name Primary?     Coronary artery disease involving native coronary artery of native heart without angina pectoris Yes    Essential hypertension     Vitamin D deficiency     Long term current use of anticoagulant therapy     S/P aortic valve replacement      current treatment plan is effective, no change in therapy  lab results and schedule of future lab studies reviewed with patient  reviewed diet, exercise and weight control  cardiovascular risk and specific lipid/LDL goals reviewed  use of aspirin to prevent MI and TIA's discussed      HPI  Today, Mr. Dontrell Espinal has no complaints of chest pains, increased shortness of breath or decreased exercise capacity. Unfortunately, he has not been able lose significant weight. His weight is now up to 291 pounds. His weight was 270 pounds back in 2015. I last saw him back in 2015. Review of Systems   Respiratory: Negative for shortness of breath. Cardiovascular: Negative for chest pain, palpitations, orthopnea, claudication, leg swelling and PND. Musculoskeletal: Positive for joint pain and myalgias. All other systems reviewed and are negative. Physical Exam   Constitutional: He is oriented to person, place, and time. He appears well-developed and well-nourished. HENT:   Head: Normocephalic. Eyes: Conjunctivae are normal.   Neck: Neck supple. No JVD present. Carotid bruit is not present. No thyromegaly present. Cardiovascular: Normal rate and regular rhythm. Crisp mechanical aortic valve tones   Pulmonary/Chest: Breath sounds normal.   Abdominal: Bowel sounds are normal.   Musculoskeletal: He exhibits no edema. Neurological: He is alert and oriented to person, place, and time. Skin: Skin is warm and dry. Nursing note and vitals reviewed.       PCP: Tres Rajan MD    Past Medical History:   Diagnosis Date    Allergic asthma     Allergic rhinitis     Anticoagulated on Coumadin     CABG x 1 10/31/2007    SVG to OM and aortic valve replacement by Dr. Erick Luther CAD (coronary artery disease)     Cardiac cath 08/24/2007    mRCA 30%. LM patent. LAD patent. mCX 45%. LVEDP 40.  EF 35%. LVE. Global hypk. 2+ AI. RA 11.  RV 44/8. PA 45/22. W 34.  CO/CI 7.4/3.0 ([facility]); 6.8/2.7 ([facility]).  Cardiac echocardiogram 07/15/2014    EF normal.  Septal WMA c/w past CABG. LVH. Mild LAE. Mild JUVENAL. Prosthetic AV w/normal function (mean grad 11 mmHg).  Cardiac nuclear imaging test, mod risk 04/14/2014    Patchy radiotracer uptake. Sm, fixed basal inferior defect most c/w artifact. EF 43%. Mild septal hypk c/w past CABG. Mild mid anterior hypk. Neg EKG on pharm stress test.  Intermediate risk.  Difficulty in voiding     Dr. Zuhair Nixon; On Tamsulosin    Gout     History of aortic valve disease     Hypertension     Hypogonadism, male     Dr. Zuhair Nixon; On TRT via IM injection of testosterone 200 mg every 3 weeks       Hypothyroidism     Left ventricular dilatation     with lower limits of normal left ventricular systolic function.  Obesity, Class II, BMI 35-39.9     Obstructive sleep apnea on CPAP     Osteoarthritis of both knees 6/1/2014    Postoperative anemia due to acute blood loss 6/2/2014    S/P aortic valve replacement 10/31/2007    Dr. Erick Luther S/P gastric bypass 4/2009    S/P Gastric bypass (4/2009 - Dr. Celestina Dhillon)    Vitamin D deficiency 6/6/2014    Vitamin D 25-Hydroxy 19.9       Past Surgical History:   Procedure Laterality Date    GASTRIC BYPASS,OBESITY,W/SM BOWEL RECONS  4/2009    Dr. Celestina Dhillon; 345 pounds with target weight of 200 pounds.     HX AORTIC VALVE REPLACEMENT  10/31/2007    Dr. Bert Herrmann  10/31/2007    Dr. Thea Howard (SVG to )    HX GASTRIC BYPASS  2009    HX HEENT      S/P Rhinoplasty    HX KNEE ARTHROSCOPY Right     2x    HX KNEE REPLACEMENT Bilateral 6/02/2014    S/P Bilateral total knee replacement (6/02/2014 - Dr. Echo Rivera)    HX LUMBAR LAMINECTOMY  1990       Current Outpatient Prescriptions   Medication Sig Dispense Refill    losartan (COZAAR) 50 mg tablet take 1 tablet by mouth daily 10 Tab 1    albuterol (PROVENTIL HFA, VENTOLIN HFA, PROAIR HFA) 90 mcg/actuation inhaler Take  by inhalation every four (4) hours as needed for Wheezing.  tamsulosin (FLOMAX) 0.4 mg capsule TAKE 1 CAPSULE BY MOUTH ONCE DAILY 90 Cap 3    warfarin (COUMADIN) 5 mg tablet TAKE 1 TABLET BY MOUTH EVERY DAY OR AS DIRECTED BY OUR OFFICE 135 Tab 1    pediatric multivitamins chewable tablet Take 1 Tab by mouth two (2) times a day.  aspirin 81 mg chewable tablet Take 81 mg by mouth daily (with breakfast).  allopurinol (ZYLOPRIM) 300 mg tablet Take 300 mg by mouth daily.  mometasone (NASONEX) 50 mcg/actuation nasal spray 1 [place] by Both Nostrils route daily.  testosterone cypionate (DEPOTESTOTERONE CYPIONATE) 200 mg/mL injection 200 mg by IntraMUSCular route every twenty-one (21) days.  fluticasone-salmeterol (ADVAIR DISKUS) 100-50 mcg/dose diskus inhaler Take 1 Puff by inhalation every twelve (12) hours. 250-50 mcg instructed to use DOS      levothyroxine (SYNTHROID) 75 mcg tablet Take 75 mcg by mouth Daily (before breakfast). Instructed to take DOS      colchicine 0.6 mg tablet Take 0.6 mg by mouth two (2) times a day.          The patient has a family history of    Social History   Substance Use Topics    Smoking status: Never Smoker    Smokeless tobacco: Never Used    Alcohol use No       Lab Results   Component Value Date/Time    Cholesterol, total 228 01/06/2017 07:05 PM    HDL Cholesterol 46 01/06/2017 07:05 PM    LDL, calculated 144.2 01/06/2017 07:05 PM    Triglyceride 189 01/06/2017 07:05 PM    CHOL/HDL Ratio 5.0 01/06/2017 07:05 PM        BP Readings from Last 3 Encounters:   11/07/17 170/88   01/07/17 (!) 166/94   01/06/17 132/70        Pulse Readings from Last 3 Encounters:   11/07/17 67   01/07/17 81   01/06/17 68       Wt Readings from Last 3 Encounters:   11/07/17 132 kg (291 lb)   01/07/17 126.1 kg (278 lb)   01/06/17 131.1 kg (289 lb)         EKG: unchanged from previous tracings, normal sinus rhythm, nonspecific ST and T waves changes, 1st degree AV block.

## 2017-11-07 NOTE — PROGRESS NOTES
1. Have you been to the ER, urgent care clinic since your last visit? Hospitalized since your last visit? Yes, 1/7/17 for left sided facial numbess    2. Have you seen or consulted any other health care providers outside of the [address] [place] [address] since your last visit? Include any pap smears or colon screening.   No

## 2017-11-07 NOTE — MR AVS SNAPSHOT
Visit Information Date & Time Provider Department Dept. Phone Encounter #  
 11/7/2017  4:00 PM aJmaica Mohan MD [facility] [id number] Upcoming Health Maintenance Date Due Hepatitis C Screening 1965 DTaP/Tdap/Td series (1 - Tdap) 11/29/1985 FOBT Q 1 YEAR AGE 50-75 11/29/2014 Influenza Age 5 to Adult 8/1/2017 Allergies as of 11/7/2017  Review Complete On: 1/9/2017 By: Will Garcia MD  
  
 Severity Noted Reaction Type Reactions Hydromorphone  11/13/2014    Other (comments) Makes anxious and  Amplifies pain  
 Nsaids (Non-steroidal Anti-inflammatory Drug)  11/13/2014    Other (comments) Cannot take because he has a heart valve Current Immunizations  Never Reviewed Name Date Pneumococcal Polysaccharide (PPSV-23) 6/4/2014  8:41 AM  
  
 Not reviewed this visit You Were Diagnosed With   
  
 Codes Comments Coronary artery disease involving native coronary artery of native heart without angina pectoris    -  Primary ICD-10-CM: I25.10 ICD-9-CM: 414.01 Essential hypertension     ICD-10-CM: I10 
ICD-9-CM: 401.9 Vitamin D deficiency     ICD-10-CM: E55.9 ICD-9-CM: 268.9 Long term current use of anticoagulant therapy     ICD-10-CM: Z79.01 
ICD-9-CM: V58.61 S/P aortic valve replacement     ICD-10-CM: Z95.2 ICD-9-CM: V43.3 Vitals BP Pulse Height(growth percentile) Weight(growth percentile) SpO2 BMI  
 170/88 (BP 1 Location: Right arm, BP Patient Position: Sitting) 67 5' 11\" (1.803 m) 291 lb (132 kg) 98% 40.59 kg/m2 Smoking Status Never Smoker Vitals History BMI and BSA Data Body Mass Index Body Surface Area 40.59 kg/m 2 2.57 m 2 Preferred Pharmacy Pharmacy Name Phone [id number] [address], [address] [facility] [facility] [phone] Your Updated Medication List  
  
   
 This list is accurate as of: 11/7/17  4:10 PM.  Always use your most recent med list.  
  
  
  
  
 ADVAIR DISKUS 100-50 mcg/dose diskus inhaler Generic drug:  fluticasone-salmeterol Take 1 Puff by inhalation every twelve (12) hours. 250-50 mcg instructed to use DOS  
  
 albuterol 90 mcg/actuation inhaler Commonly known as:  PROVENTIL HFA, VENTOLIN HFA, PROAIR HFA Take  by inhalation every four (4) hours as needed for Wheezing. allopurinol 300 mg tablet Commonly known as:  Edi Rist Take 300 mg by mouth daily. aspirin 81 mg chewable tablet Take 81 mg by mouth daily (with breakfast). colchicine 0.6 mg tablet Take 0.6 mg by mouth two (2) times a day. losartan 50 mg tablet Commonly known as:  COZAAR  
take 1 tablet by mouth daily NASONEX 50 mcg/actuation nasal spray Generic drug:  mometasone 1 [place] by Both Nostrils route daily. pediatric multivitamins chewable tablet Take 1 Tab by mouth two (2) times a day. SYNTHROID 75 mcg tablet Generic drug:  levothyroxine Take 75 mcg by mouth Daily (before breakfast). Instructed to take DOS  
  
 tamsulosin 0.4 mg capsule Commonly known as:  FLOMAX TAKE 1 CAPSULE BY MOUTH ONCE DAILY  
  
 testosterone cypionate 200 mg/mL injection Commonly known as:  DEPOTESTOTERONE CYPIONATE  
200 mg by IntraMUSCular route every twenty-one (21) days. warfarin 5 mg tablet Commonly known as:  COUMADIN  
TAKE 1 TABLET BY MOUTH EVERY DAY OR AS DIRECTED BY OUR OFFICE We Performed the Following AMB POC EKG ROUTINE W/ 12 LEADS, INTER & REP [44602 CPT(R)] AMB PT/INR EXTERNAL [[id number] CPT(R)] Comments: This external order was created through the Results Console. Introducing [facility]! Virginia Florentino introduces [employer] patient portal. Now you can access parts of your medical record, email your doctor's office, and request medication refills online.    
 
1. In your internet browser, go to [email or web address] [employer] 2. Click on the First Time User? Click Here link in the Sign In box. You will see the New Member Sign Up page. 3. Enter your [employer] Access Code exactly as it appears below. You will not need to use this code after youve completed the sign-up process. If you do not sign up before the expiration date, you must request a new code. · [employer] Access Code: [email or web address] Expires: 2/5/2018  4:06 PM 
 
4. Enter the last four digits of your Social Security Number (xxxx) and Date of Birth (mm/dd/yyyy) as indicated and click Submit. You will be taken to the next sign-up page. 5. Create a [employer] ID. This will be your [employer] login ID and cannot be changed, so think of one that is secure and easy to remember. 6. Create a [employer] password. You can change your password at any time. 7. Enter your Password Reset Question and Answer. This can be used at a later time if you forget your password. 8. Enter your e-mail address. You will receive e-mail notification when new information is available in [address] 9. Click Sign Up. You can now view and download portions of your medical record. 10. Click the Download Summary menu link to download a portable copy of your medical information. If you have questions, please visit the Frequently Asked Questions section of the [employer] website. Remember, [employer] is NOT to be used for urgent needs. For medical emergencies, dial 911. Now available from your iPhone and Android! Please provide this summary of care documentation to your next provider. Your primary care clinician is listed as Starr Aleman. If you have any questions after today's visit, please call [phone].

## 2017-11-11 LAB — INR, EXTERNAL: 2.3

## 2017-11-13 ENCOUNTER — TELEPHONE ANTICOAG (OUTPATIENT)
Dept: CARDIOLOGY CLINIC | Age: 52
End: 2017-11-13

## 2017-11-13 DIAGNOSIS — Z79.01 LONG TERM CURRENT USE OF ANTICOAGULANT THERAPY: ICD-10-CM

## 2017-11-13 DIAGNOSIS — Z95.2 S/P AORTIC VALVE REPLACEMENT: ICD-10-CM

## 2017-11-13 NOTE — PROGRESS NOTES
Verbal order and read back per Jenn Frankel NP  The INR is stable and therapeutic.  Continue same dose of coumadin and recheck in 1 week  Continue Coumadin 2.5mg daily except 5mg on Mon, Wed, and Fri (only call with dose adjustments  Deric Arteaga LPN

## 2017-12-02 LAB — INR, EXTERNAL: 1.5

## 2017-12-04 ENCOUNTER — TELEPHONE ANTICOAG (OUTPATIENT)
Dept: CARDIOLOGY CLINIC | Age: 52
End: 2017-12-04

## 2017-12-04 DIAGNOSIS — Z79.01 LONG TERM CURRENT USE OF ANTICOAGULANT THERAPY: ICD-10-CM

## 2017-12-04 DIAGNOSIS — Z95.2 S/P AORTIC VALVE REPLACEMENT: ICD-10-CM

## 2017-12-04 NOTE — PROGRESS NOTES
Verbal order and read back per Mariann Merrill NP  The INR is below the therapeutic range. Please make the following adjustments to Coumadin dosing: Take 7.5 mg today then Continue Coumadin 2.5mg daily except 5mg on Mon, Wed, and Fri  Repeat the INR in 1 week. No answer, left instructions on voicemail and asked patient to call office to verify receipt of message and any changes or missed doses.  Williams Soria LPN

## 2017-12-14 LAB — INR, EXTERNAL: 2.1

## 2017-12-14 RX ORDER — WARFARIN SODIUM 5 MG/1
TABLET ORAL
Qty: 90 TAB | Refills: 3 | Status: SHIPPED | OUTPATIENT
Start: 2017-12-14 | End: 2018-12-21 | Stop reason: SDUPTHER

## 2017-12-18 ENCOUNTER — TELEPHONE ANTICOAG (OUTPATIENT)
Dept: CARDIOLOGY CLINIC | Age: 52
End: 2017-12-18

## 2017-12-18 DIAGNOSIS — Z79.01 LONG TERM CURRENT USE OF ANTICOAGULANT THERAPY: ICD-10-CM

## 2017-12-18 DIAGNOSIS — Z95.2 S/P AORTIC VALVE REPLACEMENT: ICD-10-CM

## 2017-12-18 NOTE — PROGRESS NOTES
Verbal order and read back per Samia Rosario NP  The INR is stable and therapeutic.  Continue same dose of coumadin and recheck in 1 week  Continue Coumadin 2.5mg daily except 5mg on Mon, Wed, and Fri (only call with dose adjustments) -   Isaac Hutton LPN

## 2018-01-08 LAB — INR, EXTERNAL: 2.5

## 2018-01-10 ENCOUNTER — TELEPHONE ANTICOAG (OUTPATIENT)
Dept: CARDIOLOGY CLINIC | Age: 53
End: 2018-01-10

## 2018-01-10 DIAGNOSIS — Z95.2 S/P AORTIC VALVE REPLACEMENT: ICD-10-CM

## 2018-01-10 DIAGNOSIS — Z79.01 LONG TERM CURRENT USE OF ANTICOAGULANT THERAPY: ICD-10-CM

## 2018-01-10 NOTE — PROGRESS NOTES
Verbal order and read back per Merlin Ballesteros NP  The INR is stable and therapeutic.  Continue same dose of coumadin and recheck in 1 week  Continue Coumadin 2.5mg daily except 5mg on Mon, Wed, and Fri (only call with dose adjustments)  Mendel Roes, LPN

## 2018-02-01 ENCOUNTER — TELEPHONE (OUTPATIENT)
Dept: CARDIOLOGY CLINIC | Age: 53
End: 2018-02-01

## 2018-02-01 LAB — INR, EXTERNAL: 2.4

## 2018-02-01 NOTE — TELEPHONE ENCOUNTER
Received a notice from [facility] that patient is past due for INR testing. Attempted for reach patient via phone, no answer, left message instructing patient to asking patient to perform testing and call results into [facility].  Raysa Harvey LPN

## 2018-02-02 ENCOUNTER — TELEPHONE ANTICOAG (OUTPATIENT)
Dept: CARDIOLOGY CLINIC | Age: 53
End: 2018-02-02

## 2018-02-02 DIAGNOSIS — Z95.2 S/P AORTIC VALVE REPLACEMENT: ICD-10-CM

## 2018-02-02 DIAGNOSIS — Z79.01 LONG TERM CURRENT USE OF ANTICOAGULANT THERAPY: ICD-10-CM

## 2018-02-02 NOTE — PROGRESS NOTES
Verbal order and read back per Denice Hamman, NP  The INR is stable and therapeutic.  Continue same dose of coumadin and recheck in 1 week  Continue Coumadin 2.5mg daily except 5mg on Mon, Wed, and Fri  Patient informed of instructions, read back and verbalized understanding Serina Herrera LPN

## 2018-02-25 LAB — INR, EXTERNAL: 2.5

## 2018-02-26 ENCOUNTER — TELEPHONE ANTICOAG (OUTPATIENT)
Dept: CARDIOLOGY CLINIC | Age: 53
End: 2018-02-26

## 2018-02-26 DIAGNOSIS — Z79.01 LONG TERM CURRENT USE OF ANTICOAGULANT THERAPY: ICD-10-CM

## 2018-02-26 DIAGNOSIS — Z95.2 S/P AORTIC VALVE REPLACEMENT: ICD-10-CM

## 2018-02-26 NOTE — PROGRESS NOTES
Verbal order and read back per Merlin Ballesteros NP  The INR is stable and therapeutic.  Continue same dose of coumadin and recheck in 3 weeks  Continue Coumadin 2.5mg daily except 5mg on Mon, Wed, and Fri (only call with dose adjustments) - insurance requires weekly testing    spoke to patient aware of instructions

## 2018-03-13 LAB — INR, EXTERNAL: 2.4

## 2018-03-14 ENCOUNTER — TELEPHONE ANTICOAG (OUTPATIENT)
Dept: CARDIOLOGY CLINIC | Age: 53
End: 2018-03-14

## 2018-03-14 DIAGNOSIS — Z95.2 S/P AORTIC VALVE REPLACEMENT: ICD-10-CM

## 2018-03-14 DIAGNOSIS — Z79.01 LONG TERM CURRENT USE OF ANTICOAGULANT THERAPY: ICD-10-CM

## 2018-03-14 NOTE — PROGRESS NOTES
Verbal order and read back per Brandy Covington NP  Continue Coumadin 2.5mg daily except 5mg on Mon, Wed, and Fri (only call with dose adjustments) - insurance requires weekly testing

## 2018-04-02 ENCOUNTER — TELEPHONE ANTICOAG (OUTPATIENT)
Dept: CARDIOLOGY CLINIC | Age: 53
End: 2018-04-02

## 2018-04-02 DIAGNOSIS — Z95.2 S/P AORTIC VALVE REPLACEMENT: ICD-10-CM

## 2018-04-02 DIAGNOSIS — Z79.01 LONG TERM CURRENT USE OF ANTICOAGULANT THERAPY: ICD-10-CM

## 2018-04-02 LAB — INR, EXTERNAL: 2.5

## 2018-04-02 NOTE — PROGRESS NOTES
Verbal order and read back per Regina Johns NP  Patient is within therapeutic range  Continue Coumadin 2.5mg daily except 5mg on Mon, Wed, and Fri (only call with dose adjustments) - insurance requires weekly testing  Recheck in one week

## 2018-04-05 RX ORDER — LOSARTAN POTASSIUM 50 MG/1
50 TABLET ORAL 2 TIMES DAILY
Qty: 180 TAB | Refills: 3 | Status: SHIPPED | OUTPATIENT
Start: 2018-04-05 | End: 2019-04-22 | Stop reason: SDUPTHER

## 2018-04-19 LAB — INR, EXTERNAL: 2.4

## 2018-04-20 ENCOUNTER — TELEPHONE ANTICOAG (OUTPATIENT)
Dept: CARDIOLOGY CLINIC | Age: 53
End: 2018-04-20

## 2018-04-20 DIAGNOSIS — Z95.2 S/P AORTIC VALVE REPLACEMENT: ICD-10-CM

## 2018-04-20 DIAGNOSIS — Z79.01 LONG TERM CURRENT USE OF ANTICOAGULANT THERAPY: ICD-10-CM

## 2018-04-20 NOTE — PROGRESS NOTES
Verbal order and read back per Cecilia Segovia NP  Patient is within therapeutic range  Continue Coumadin 2.5mg daily except 5mg on Mon, Wed, and Fri (only call with dose adjustments) - insurance requires weekly testing

## 2018-05-17 LAB — INR, EXTERNAL: 2.3

## 2018-05-22 ENCOUNTER — TELEPHONE ANTICOAG (OUTPATIENT)
Dept: CARDIOLOGY CLINIC | Age: 53
End: 2018-05-22

## 2018-05-22 DIAGNOSIS — Z95.2 S/P AORTIC VALVE REPLACEMENT: ICD-10-CM

## 2018-05-22 DIAGNOSIS — Z79.01 LONG TERM CURRENT USE OF ANTICOAGULANT THERAPY: ICD-10-CM

## 2018-05-22 NOTE — PROGRESS NOTES
Verbal order and read back per Gianna Ames NP  Patient is within therapeutic range  Continue Coumadin 2.5mg daily except 5mg on Mon, Wed, and Fri (only call with dose adjustments) - insurance requires weekly testing  Recheck 6/14/2018

## 2018-06-07 ENCOUNTER — TELEPHONE ANTICOAG (OUTPATIENT)
Dept: CARDIOLOGY CLINIC | Age: 53
End: 2018-06-07

## 2018-06-07 DIAGNOSIS — Z79.01 LONG TERM CURRENT USE OF ANTICOAGULANT THERAPY: ICD-10-CM

## 2018-06-07 DIAGNOSIS — Z95.2 S/P AORTIC VALVE REPLACEMENT: ICD-10-CM

## 2018-06-07 LAB — INR, EXTERNAL: 2.5

## 2018-06-08 NOTE — PROGRESS NOTES
Verbal order and read back per Nico De La Cruz NP  Patient is within therapeutic range  Continue Coumadin 2.5mg daily except 5mg on Mon, Wed, and Fri (only call with dose adjustments)  Recheck 3 weeks

## 2018-07-09 LAB — INR, EXTERNAL: 3.4

## 2018-07-10 ENCOUNTER — TELEPHONE ANTICOAG (OUTPATIENT)
Dept: CARDIOLOGY CLINIC | Age: 53
End: 2018-07-10

## 2018-07-10 DIAGNOSIS — Z95.2 S/P AORTIC VALVE REPLACEMENT: ICD-10-CM

## 2018-07-10 DIAGNOSIS — Z79.01 LONG TERM CURRENT USE OF ANTICOAGULANT THERAPY: ICD-10-CM

## 2018-07-10 NOTE — PROGRESS NOTES
Verbal order and read back per Lindy Chandler NP  Patient is not with therapeutic range  Patient held 7/9 dose will Continue Coumadin 2.5mg daily except 5mg on Mon, Wed, and Fri (only call with dose adjustments)  Patient request 4 week recheck   This has been fully explained to the patient, who indicates understanding.

## 2018-08-02 LAB — INR, EXTERNAL: 2.3

## 2018-08-03 ENCOUNTER — TELEPHONE ANTICOAG (OUTPATIENT)
Dept: CARDIOLOGY CLINIC | Age: 53
End: 2018-08-03

## 2018-08-03 DIAGNOSIS — Z95.2 S/P AORTIC VALVE REPLACEMENT: ICD-10-CM

## 2018-08-03 DIAGNOSIS — Z79.01 LONG TERM CURRENT USE OF ANTICOAGULANT THERAPY: ICD-10-CM

## 2018-08-03 NOTE — PROGRESS NOTES
Verbal order and read back per Carolyn Clarity, NP  Patient is within therapeutic range   Continue Coumadin 2.5mg daily except 5mg on Mon, Wed, and Fri (only call with dose adjustments)  Recheck 2 to 4 weeks  This has been fully explained to the patient, who indicates understanding.

## 2018-08-25 LAB — INR, EXTERNAL: 2.3

## 2018-08-27 ENCOUNTER — TELEPHONE ANTICOAG (OUTPATIENT)
Dept: CARDIOLOGY CLINIC | Age: 53
End: 2018-08-27

## 2018-08-27 DIAGNOSIS — Z95.2 S/P AORTIC VALVE REPLACEMENT: ICD-10-CM

## 2018-08-27 DIAGNOSIS — Z79.01 LONG TERM CURRENT USE OF ANTICOAGULANT THERAPY: ICD-10-CM

## 2018-08-27 NOTE — PROGRESS NOTES
Verbal order and read back per Suhas Parnell NP  Patient is within therapeutic range  Continue Coumadin 2.5mg daily except 5mg on Mon, Wed, and Fri (only call with dose adjustments)  Recheck 4 weeks

## 2018-09-20 NOTE — PROGRESS NOTES
Patient submitted multiple test results on one day. One test result was from 6/26/17 another from 7/8/17. The INR is above the therapeutic range. Ask the patient about bleeding complications. Please make the following adjustments to Coumadin dosing:Hold X 1 (patient held dose on Sunday) then Continue Coumadin 2.5mg daily except 5mg on Mon, Wed, and Fri.  - insurance requires weekly testing  Repeat the INR in 1 week.   Patient informed of instructions, read back and verbalized understanding Bro Lacy LPN no

## 2018-10-15 ENCOUNTER — TELEPHONE ANTICOAG (OUTPATIENT)
Dept: CARDIOLOGY CLINIC | Age: 53
End: 2018-10-15

## 2018-10-15 DIAGNOSIS — Z79.01 LONG TERM CURRENT USE OF ANTICOAGULANT THERAPY: ICD-10-CM

## 2018-10-15 DIAGNOSIS — Z95.2 S/P AORTIC VALVE REPLACEMENT: ICD-10-CM

## 2018-10-15 LAB — INR, EXTERNAL: 2.1

## 2018-10-16 NOTE — PROGRESS NOTES
Verbal order and read back per Erin Celeste NP  Patient is within therapeutic range  Continue Coumadin 2.5mg daily except 5mg on Mon, Wed, and Fri (only call with dose adjustments)  Recheck 4 weeks

## 2018-12-10 LAB — INR, EXTERNAL: 2.2

## 2018-12-11 ENCOUNTER — TELEPHONE ANTICOAG (OUTPATIENT)
Dept: CARDIOLOGY CLINIC | Age: 53
End: 2018-12-11

## 2018-12-11 DIAGNOSIS — Z79.01 LONG TERM CURRENT USE OF ANTICOAGULANT THERAPY: ICD-10-CM

## 2018-12-11 DIAGNOSIS — Z95.2 S/P AORTIC VALVE REPLACEMENT: ICD-10-CM

## 2018-12-11 NOTE — PROGRESS NOTES
Verbal order and read back per Erma Velez NP  Patient is within therapeutic range   Continue Coumadin 2.5mg daily except 5mg on Mon, Wed, and Fri (only call with dose adjustments)  Recheck 4 weeks

## 2018-12-24 RX ORDER — WARFARIN SODIUM 5 MG/1
TABLET ORAL
Qty: 90 TAB | Refills: 3 | Status: SHIPPED | OUTPATIENT
Start: 2018-12-24 | End: 2020-06-18 | Stop reason: SDUPTHER

## 2019-01-11 ENCOUNTER — TELEPHONE ANTICOAG (OUTPATIENT)
Dept: CARDIOLOGY CLINIC | Age: 54
End: 2019-01-11

## 2019-01-11 DIAGNOSIS — Z79.01 LONG TERM CURRENT USE OF ANTICOAGULANT THERAPY: ICD-10-CM

## 2019-01-11 DIAGNOSIS — Z95.2 S/P AORTIC VALVE REPLACEMENT: ICD-10-CM

## 2019-01-11 LAB — INR, EXTERNAL: 3

## 2019-01-11 NOTE — PROGRESS NOTES
Verbal order and read back per Regina Johns, NP       Continue Coumadin 2.5mg daily except 5mg on Mon, Wed, and Fri (only call with dose adjustments)    1 month recheck

## 2019-01-29 ENCOUNTER — TELEPHONE ANTICOAG (OUTPATIENT)
Dept: CARDIOLOGY CLINIC | Age: 54
End: 2019-01-29

## 2019-01-29 DIAGNOSIS — Z95.2 S/P AORTIC VALVE REPLACEMENT: ICD-10-CM

## 2019-01-29 DIAGNOSIS — Z79.01 LONG TERM CURRENT USE OF ANTICOAGULANT THERAPY: ICD-10-CM

## 2019-01-29 LAB — INR, EXTERNAL: 2.4

## 2019-03-09 ENCOUNTER — TELEPHONE ANTICOAG (OUTPATIENT)
Dept: CARDIOLOGY CLINIC | Age: 54
End: 2019-03-09

## 2019-03-09 DIAGNOSIS — Z95.2 S/P AORTIC VALVE REPLACEMENT: ICD-10-CM

## 2019-03-09 DIAGNOSIS — Z79.01 LONG TERM CURRENT USE OF ANTICOAGULANT THERAPY: ICD-10-CM

## 2019-03-09 LAB — INR, EXTERNAL: 3.6

## 2019-03-11 NOTE — PROGRESS NOTES
Verbal order and read back per Jossie Barreto NP  Hold x 2 then Continue Coumadin 2.5mg daily except 5mg on Mon, Wed, and Fri (only call with dose adjustments)    Patient aware of instruction and recheck in 2 weeks

## 2019-04-22 RX ORDER — LOSARTAN POTASSIUM 50 MG/1
50 TABLET ORAL 2 TIMES DAILY
Qty: 180 TAB | Refills: 3 | Status: SHIPPED | OUTPATIENT
Start: 2019-04-22 | End: 2020-05-23

## 2019-04-23 LAB — INR, EXTERNAL: 2.2

## 2019-04-24 ENCOUNTER — TELEPHONE ANTICOAG (OUTPATIENT)
Dept: CARDIOLOGY CLINIC | Age: 54
End: 2019-04-24

## 2019-04-24 DIAGNOSIS — Z95.2 S/P AORTIC VALVE REPLACEMENT: ICD-10-CM

## 2019-04-24 DIAGNOSIS — Z79.01 LONG TERM CURRENT USE OF ANTICOAGULANT THERAPY: ICD-10-CM

## 2019-04-24 NOTE — PROGRESS NOTES
Verbal order and read back per Penny Jain NP  Patient is within therapeutic range  Continue Coumadin 2.5mg daily except 5mg on Mon, Wed, and Fri (only call with dose adjustments)  Recheck 4 weeks

## 2019-06-07 LAB — INR, EXTERNAL: 2.4

## 2019-06-10 ENCOUNTER — TELEPHONE ANTICOAG (OUTPATIENT)
Dept: CARDIOLOGY CLINIC | Age: 54
End: 2019-06-10

## 2019-06-10 DIAGNOSIS — Z79.01 LONG TERM CURRENT USE OF ANTICOAGULANT THERAPY: ICD-10-CM

## 2019-06-10 DIAGNOSIS — Z95.2 S/P AORTIC VALVE REPLACEMENT: ICD-10-CM

## 2019-06-10 NOTE — PROGRESS NOTES
Verbal order and read back per Suhas Parnell, JOSH  Patient is within therapeutic range  Continue Coumadin 2.5mg daily except 5mg on Mon, Wed, and Fri (only call with dose adjustments)  Recheck in 4 weeks

## 2019-08-20 ENCOUNTER — TELEPHONE ANTICOAG (OUTPATIENT)
Dept: CARDIOLOGY CLINIC | Age: 54
End: 2019-08-20

## 2019-08-20 DIAGNOSIS — Z79.01 LONG TERM CURRENT USE OF ANTICOAGULANT THERAPY: ICD-10-CM

## 2019-08-20 DIAGNOSIS — Z95.2 S/P AORTIC VALVE REPLACEMENT: ICD-10-CM

## 2019-08-20 LAB — INR, EXTERNAL: 2.4

## 2019-08-21 NOTE — PROGRESS NOTES
Verbal order and read back per April Ball NP  Patient is within therapeutic range  Continue Coumadin 2.5mg daily except 5mg on Mon, Wed, and Fri (only call with dose adjustments)  Recheck 4 weeks

## 2019-11-04 LAB — INR, EXTERNAL: 2.2

## 2019-11-05 ENCOUNTER — TELEPHONE ANTICOAG (OUTPATIENT)
Dept: CARDIOLOGY CLINIC | Age: 54
End: 2019-11-05

## 2019-11-05 NOTE — PROGRESS NOTES
Verbal order and read back per Kacey Ramos, DO  Continue Coumadin 2.5mg daily except 5mg on Mon, Wed, and Fri (only call with dose adjustments)

## 2020-02-05 LAB — INR, EXTERNAL: 2.9

## 2020-02-06 ENCOUNTER — TELEPHONE ANTICOAG (OUTPATIENT)
Dept: CARDIOLOGY CLINIC | Age: 55
End: 2020-02-06

## 2020-02-06 DIAGNOSIS — Z79.01 LONG TERM CURRENT USE OF ANTICOAGULANT THERAPY: ICD-10-CM

## 2020-02-06 DIAGNOSIS — Z95.2 S/P AORTIC VALVE REPLACEMENT: ICD-10-CM

## 2020-02-06 NOTE — PROGRESS NOTES
Verbal order and read back per Darrell Oviedo NP  INR within therapeutic range: 2.9(2/5/2020). Continue Coumadin 2.5mg daily except 5mg on Mon, Wed, and Fri (only call with dose adjustments)  Spoke with patient and he veriifed current dosing. Last INR report 11/5/19. States INR check not done as scheduled due to financial issues. States will do INR check in 1 month.

## 2020-03-25 ENCOUNTER — TELEPHONE (OUTPATIENT)
Dept: CARDIOLOGY CLINIC | Age: 55
End: 2020-03-25

## 2020-03-25 NOTE — TELEPHONE ENCOUNTER
Patient is past due for his INR home check. Message left with a reminder to do INR check and notify this office of results.

## 2020-03-27 LAB — INR, EXTERNAL: 2.8

## 2020-03-30 ENCOUNTER — TELEPHONE ANTICOAG (OUTPATIENT)
Dept: CARDIOLOGY CLINIC | Age: 55
End: 2020-03-30

## 2020-03-30 DIAGNOSIS — Z79.01 LONG TERM CURRENT USE OF ANTICOAGULANT THERAPY: ICD-10-CM

## 2020-03-30 DIAGNOSIS — Z95.2 S/P AORTIC VALVE REPLACEMENT: ICD-10-CM

## 2020-03-30 NOTE — PROGRESS NOTES
Verbal order and read back [facility], DO  The INR is stable and therapeutic.  Continue same dose of coumadin and recheck in 2weeks  Continue Coumadin 2.5mg daily except 5mg on Mon, Wed, and Fri (only call with dose adjustments)

## 2020-04-24 LAB — INR, EXTERNAL: 2.3

## 2020-04-27 ENCOUNTER — TELEPHONE ANTICOAG (OUTPATIENT)
Dept: CARDIOLOGY CLINIC | Age: 55
End: 2020-04-27

## 2020-04-27 DIAGNOSIS — Z95.2 S/P AORTIC VALVE REPLACEMENT: ICD-10-CM

## 2020-04-27 DIAGNOSIS — Z79.01 LONG TERM CURRENT USE OF ANTICOAGULANT THERAPY: ICD-10-CM

## 2020-04-27 NOTE — PROGRESS NOTES
Verbal order and read back per Kena Ramesh MD  The INR is stable and therapeutic. Continue same dose of coumadin (Continue Coumadin 2.5mg daily except 5mg on Mon, Wed, and Fri (only call with dose adjustments))  recheck in 1 month.

## 2020-05-23 RX ORDER — LOSARTAN POTASSIUM 50 MG/1
TABLET ORAL
Qty: 180 TAB | Refills: 3 | Status: SHIPPED | OUTPATIENT
Start: 2020-05-23

## 2020-05-27 LAB — INR, EXTERNAL: 2.6

## 2020-05-28 ENCOUNTER — TELEPHONE ANTICOAG (OUTPATIENT)
Dept: CARDIOLOGY CLINIC | Age: 55
End: 2020-05-28

## 2020-05-28 DIAGNOSIS — Z95.2 S/P AORTIC VALVE REPLACEMENT: ICD-10-CM

## 2020-05-28 DIAGNOSIS — Z79.01 LONG TERM CURRENT USE OF ANTICOAGULANT THERAPY: ICD-10-CM

## 2020-05-28 NOTE — PROGRESS NOTES
Verbal order and read back per Maurice Leiva MD  The INR is stable and therapeutic. Continue same dose of coumadin and recheck in 1 month.   Continue Coumadin 2.5mg daily except 5mg on Mon, Wed, and Fri (only call with dose adjustments)

## 2020-06-18 ENCOUNTER — OFFICE VISIT (OUTPATIENT)
Dept: CARDIOLOGY CLINIC | Age: 55
End: 2020-06-18

## 2020-06-18 VITALS
WEIGHT: 308 LBS | DIASTOLIC BLOOD PRESSURE: 60 MMHG | SYSTOLIC BLOOD PRESSURE: 138 MMHG | HEIGHT: 71 IN | HEART RATE: 68 BPM | BODY MASS INDEX: 43.12 KG/M2 | OXYGEN SATURATION: 98 %

## 2020-06-18 DIAGNOSIS — Z95.2 S/P AORTIC VALVE REPLACEMENT: Primary | ICD-10-CM

## 2020-06-18 DIAGNOSIS — E66.01 OBESITY, MORBID (HCC): ICD-10-CM

## 2020-06-18 DIAGNOSIS — Z79.01 LONG TERM CURRENT USE OF ANTICOAGULANT THERAPY: ICD-10-CM

## 2020-06-18 RX ORDER — ROSUVASTATIN CALCIUM 20 MG/1
20 TABLET, COATED ORAL
COMMUNITY
Start: 2020-05-22

## 2020-06-18 RX ORDER — WARFARIN SODIUM 5 MG/1
TABLET ORAL
Qty: 90 TAB | Refills: 3 | Status: SHIPPED | OUTPATIENT
Start: 2020-06-18 | End: 2021-07-09

## 2020-06-18 NOTE — PROGRESS NOTES
HISTORY OF PRESENT ILLNESS  Zakia Swanson is a 53 y.o. male. ASSESSMENT and PLAN    Mr. Elaine Olsen has history of mechanical aortic valve replacement and single-vessel bypass back in October of 2007 by Dr. Alexus Jhaveri. He also has history of gastric bypass surgery in April of 2009 with initial weight of 345 pounds. His target was 200 pounds. He did reach 220 pounds. Unfortunately, because of severe bilateral knee osteoarthritis, he has not been able to reach his target; his weight is now 276 pounds. For his bilateral knee replacements in June of 2014, he had preoperative pharmacologic nuclear scan to rule out significant ischemia. This was without significant changes. He tolerated his knee replacement surgery well. In July of 2014, he was admitted to [facility] with intractable vomiting from narcotic withdrawal. His troponin was elevated. He was seen in the emergency room for left sided facial numbness in January 2017. He was diagnosed with Bell's palsy. · CAD:    Clinically stable. · AVR: Crisp valve tones. · BP:    Well controlled. · HR:     Stable. First-degree heart block. · CHF:    Currently, there is no evidence of decompensated CHF noted. · Weight:    His weight today is 308 pounds. · Cholesterol:   Target LDL <70. Because of gastric bypass surgery, he cannot tolerate statins. · Anti-platelet:   Remains on ASA, and Coumadin. I will see him back in 6 months. Thank you. Encounter Diagnoses   Name Primary?     S/P aortic valve replacement Yes    Long term current use of anticoagulant therapy     Obesity, morbid ([address]      current treatment plan is effective, no change in therapy  lab results and schedule of future lab studies reviewed with patient  reviewed diet, exercise and weight control  cardiovascular risk and specific lipid/LDL goals reviewed  use of aspirin to prevent MI and TIA's discussed      HPI   Today, Mr. Checo Parham has no complaints of chest pains, increased shortness of breath or decreased exercise capacity. He denies any orthopnea or PND. He denies any palpitations or dizziness. The most worrisome issue is continued weight gain. Back in 2010, he weighed 220 pounds. In 2017, last time I saw him, he weighed 290 pounds. Currently his weight up is 308 pounds. Review of Systems   Respiratory: Negative for shortness of breath. Cardiovascular: Negative for chest pain, palpitations, orthopnea, claudication, leg swelling and PND. All other systems reviewed and are negative. Physical Exam  Vitals signs and nursing note reviewed. Constitutional:       Appearance: Normal appearance. HENT:      Head: Normocephalic. Eyes:      Extraocular Movements: Extraocular movements intact. Conjunctiva/sclera: Conjunctivae normal.   Neck:      Musculoskeletal: No neck rigidity. Cardiovascular:      Rate and Rhythm: Normal rate and regular rhythm. Pulmonary:      Breath sounds: Normal breath sounds. Abdominal:      General: Bowel sounds are normal.      Palpations: Abdomen is soft. Musculoskeletal:         General: No swelling. Skin:     General: Skin is warm and dry. Neurological:      General: No focal deficit present. Mental Status: He is alert and oriented to person, place, and time. Psychiatric:         Mood and Affect: Mood normal.         Behavior: Behavior normal.         PCP: Dani Bernabe MD    Past Medical History:   Diagnosis Date    Allergic asthma     Allergic rhinitis     Anticoagulated on Coumadin     CABG x 1 10/31/2007    SVG to OM and aortic valve replacement by Dr. Obed Dickerson CAD (coronary artery disease)     Cardiac cath 08/24/2007    mRCA 30%. LM patent. LAD patent. mCX 45%. LVEDP 40.  EF 35%. LVE. Global hypk. 2+ AI. RA 11.  RV 44/8. PA 45/22. W 34.  CO/CI 7.4/3.0 (TD); 6.8/2.7 ([place]).  Cardiac echocardiogram 07/15/2014    EF normal.  Septal WMA c/w past CABG. LVH.   Mild LAE.  Mild JUVENAL. Prosthetic AV w/normal function (mean grad 11 mmHg).  Cardiac nuclear imaging test, mod risk 04/14/2014    Patchy radiotracer uptake. Sm, fixed basal inferior defect most c/w artifact. EF 43%. Mild septal hypk c/w past CABG. Mild mid anterior hypk. Neg EKG on pharm stress test.  Intermediate risk.  Difficulty in voiding     Dr. Dennise Zavaleta; On Tamsulosin    Gout     History of aortic valve disease     Hypertension     Hypogonadism, male     Dr. Dennise Zavaleta; On TRT via IM injection of testosterone 200 mg every 3 weeks       Hypothyroidism     Left ventricular dilatation     with lower limits of normal left ventricular systolic function.  Obesity, Class II, BMI 35-39.9     Obstructive sleep apnea on CPAP     Osteoarthritis of both knees 6/1/2014    Postoperative anemia due to acute blood loss 6/2/2014    S/P aortic valve replacement 10/31/2007    Dr. Tierney Cameron S/P gastric bypass 4/2009    S/P Gastric bypass (4/2009 - Dr. Michelle Hendrickson)    Vitamin D deficiency 6/6/2014    Vitamin D 25-Hydroxy 19.9       Past Surgical History:   Procedure Laterality Date    GASTRIC BYPASS,OBESITY,W/SM BOWEL RECONS  4/2009    Dr. Michelle Hendrickson; 345 pounds with target weight of 200 pounds.  HX AORTIC VALVE REPLACEMENT  10/31/2007    Dr. Orosco Blocker  10/31/2007    Dr. Sabra Alonso (SVG to )    HX GASTRIC BYPASS  2009    HX HEENT      S/P Rhinoplasty    HX KNEE ARTHROSCOPY Right     2x    HX KNEE REPLACEMENT Bilateral 6/02/2014    S/P Bilateral total knee replacement (6/02/2014 - Dr. Mercedes Fu)    HX LUMBAR LAMINECTOMY  1990       Current Outpatient Medications   Medication Sig Dispense Refill    rosuvastatin (CRESTOR) 20 mg tablet Take 20 mg by mouth nightly.       warfarin (COUMADIN) 5 mg tablet TAKE 1 TABLET BY MOUTH EVERY DAY OR AS DIRECTED BY OUR OFFICE 90 Tab 3    losartan (COZAAR) 50 mg tablet TAKE 1 TABLET BY MOUTH TWICE A DAY 180 Tab 3    albuterol (PROVENTIL HFA, VENTOLIN HFA, PROAIR HFA) 90 mcg/actuation inhaler Take  by inhalation every four (4) hours as needed for Wheezing.  tamsulosin (FLOMAX) 0.4 mg capsule TAKE 1 CAPSULE BY MOUTH ONCE DAILY 90 Cap 3    pediatric multivitamins chewable tablet Take 1 Tab by mouth two (2) times a day.  aspirin 81 mg chewable tablet Take 81 mg by mouth daily (with breakfast).  allopurinol (ZYLOPRIM) 300 mg tablet Take 300 mg by mouth daily.  mometasone (NASONEX) 50 mcg/actuation nasal spray 1 [place] by Both Nostrils route daily.  testosterone cypionate (DEPOTESTOTERONE CYPIONATE) 200 mg/mL injection 200 mg by IntraMUSCular route every twenty-one (21) days.  fluticasone-salmeterol (ADVAIR DISKUS) 100-50 mcg/dose diskus inhaler Take 1 Puff by inhalation every twelve (12) hours. 250-50 mcg instructed to use DOS      levothyroxine (SYNTHROID) 75 mcg tablet Take 75 mcg by mouth Daily (before breakfast). Instructed to take DOS      colchicine 0.6 mg tablet Take 0.6 mg by mouth two (2) times a day.          The patient has a family history of    Social History     Tobacco Use    Smoking status: Never Smoker    Smokeless tobacco: Never Used   Substance Use Topics    Alcohol use: No    Drug use: Not on file       Lab Results   Component Value Date/Time    Cholesterol, total 228 (H) 01/06/2017 07:05 PM    HDL Cholesterol 46 01/06/2017 07:05 PM    LDL, calculated 144.2 (H) 01/06/2017 07:05 PM    Triglyceride 189 (H) 01/06/2017 07:05 PM    CHOL/HDL Ratio 5.0 01/06/2017 07:05 PM        BP Readings from Last 3 Encounters:   06/18/20 138/60   11/07/17 170/88   01/07/17 (!) 166/94        Pulse Readings from Last 3 Encounters:   06/18/20 68   11/07/17 67   01/07/17 81       Wt Readings from Last 3 Encounters:   06/18/20 139.7 kg (308 lb)   11/07/17 132 kg (291 lb)   01/07/17 126.1 kg (278 lb)         EKG: unchanged from previous tracings, normal sinus rhythm, nonspecific ST and T waves changes, 1st degree AV block.

## 2020-08-01 LAB — INR, EXTERNAL: 4.4

## 2020-08-03 ENCOUNTER — TELEPHONE ANTICOAG (OUTPATIENT)
Dept: CARDIOLOGY CLINIC | Age: 55
End: 2020-08-03

## 2020-08-03 NOTE — PROGRESS NOTES
Verbal order and read back per Paulo Douglas, NP  Continue Coumadin 2.5mg daily except 5mg on Mon, Wed, and Fri (only call with dose adjustments)

## 2020-10-22 LAB — INR, EXTERNAL: 4.1

## 2020-10-23 ENCOUNTER — TELEPHONE ANTICOAG (OUTPATIENT)
Dept: CARDIOLOGY CLINIC | Age: 55
End: 2020-10-23

## 2020-10-23 NOTE — PROGRESS NOTES
Verbal order and read back per Connor Elias NP  iNR above therapeutic range  Coumadin 2.5mg today(patient didn't take dose 10/22/2020), then Continue Coumadin 2.5mg daily except 5mg on Mon, Wed, and Fri (only call with dose adjustments)  Patient made aware of dosing and recheck instructions, no questions.

## 2020-11-19 ENCOUNTER — TELEPHONE ANTICOAG (OUTPATIENT)
Dept: CARDIOLOGY CLINIC | Age: 55
End: 2020-11-19

## 2020-11-19 LAB — INR, EXTERNAL: 3.9

## 2020-11-19 NOTE — PROGRESS NOTES
Verbal order and read back per Michela Gowers, MD  INR above therapeutic range  Hold Coumadin today, then change to Coumadin 2.5mg daily except 5mg on Mondays and Wednesdays. (only call with dose adjustments)  Patient made aware of dosing and recheck instructions, no questions.

## 2020-12-03 ENCOUNTER — TELEPHONE ANTICOAG (OUTPATIENT)
Dept: CARDIOLOGY CLINIC | Age: 55
End: 2020-12-03

## 2020-12-03 LAB — INR, EXTERNAL: 2.4 (ref 2–3)

## 2020-12-27 LAB — INR, EXTERNAL: 2.1

## 2020-12-28 ENCOUNTER — TELEPHONE ANTICOAG (OUTPATIENT)
Dept: CARDIOLOGY CLINIC | Age: 55
End: 2020-12-28

## 2020-12-28 DIAGNOSIS — Z95.2 S/P AORTIC VALVE REPLACEMENT: ICD-10-CM

## 2020-12-28 DIAGNOSIS — Z79.01 LONG TERM CURRENT USE OF ANTICOAGULANT THERAPY: ICD-10-CM

## 2020-12-28 NOTE — PROGRESS NOTES
The INR is stable and therapeutic. Continue same dose of coumadin (Continue Coumadin 2.5mg daily except 5mg on Mondays and Wednesdays.  (only call with dose adjustments))

## 2021-02-09 LAB — INR, EXTERNAL: 2.9

## 2021-02-10 ENCOUNTER — TELEPHONE ANTICOAG (OUTPATIENT)
Dept: CARDIOLOGY CLINIC | Age: 56
End: 2021-02-10

## 2021-03-07 LAB — INR, EXTERNAL: 2.5

## 2021-03-08 ENCOUNTER — TELEPHONE ANTICOAG (OUTPATIENT)
Dept: CARDIOLOGY CLINIC | Age: 56
End: 2021-03-08

## 2021-03-08 DIAGNOSIS — Z95.2 S/P AORTIC VALVE REPLACEMENT: ICD-10-CM

## 2021-03-08 DIAGNOSIS — Z79.01 LONG TERM CURRENT USE OF ANTICOAGULANT THERAPY: ICD-10-CM

## 2021-03-08 NOTE — PROGRESS NOTES
Verbal order and read back per Martha Stuart NP  INR within therapeutic range  Continue Coumadin 2.5mg daily except 5mg on Mondays and Wednesdays.  (only call with dose adjustments)

## 2021-04-07 ENCOUNTER — TELEPHONE ANTICOAG (OUTPATIENT)
Dept: CARDIOLOGY CLINIC | Age: 56
End: 2021-04-07

## 2021-04-07 DIAGNOSIS — Z95.2 S/P AORTIC VALVE REPLACEMENT: ICD-10-CM

## 2021-04-07 DIAGNOSIS — Z79.01 LONG TERM CURRENT USE OF ANTICOAGULANT THERAPY: ICD-10-CM

## 2021-04-07 LAB — INR, EXTERNAL: 2.4

## 2021-04-07 NOTE — PROGRESS NOTES
Verbal order and read back per Christian Cai NP  INR within therapeutic range  Continue Coumadin 2.5mg daily except 5mg on Mondays and Wednesdays. Recheck in 1 month.  (only call with dose adjustments)

## 2021-04-16 ENCOUNTER — OFFICE VISIT (OUTPATIENT)
Dept: CARDIOLOGY CLINIC | Age: 56
End: 2021-04-16

## 2021-04-16 VITALS — BODY MASS INDEX: 42.96 KG/M2 | HEIGHT: 71 IN

## 2021-04-16 DIAGNOSIS — Z95.2 S/P AORTIC VALVE REPLACEMENT: Primary | ICD-10-CM

## 2021-04-16 NOTE — PROGRESS NOTES
Estuardo Rodriguez presents today for   Chief Complaint   Patient presents with    Follow-up     10 month f/u, Joanneue       Justin Chow preferred language for health care discussion is english/other. Is someone accompanying this pt? no    Is the patient using any DME equipment during [address]? no    Depression Screening:  3 most recent PHQ Screens 4/16/2021   Little interest or pleasure in doing things Not at all   Feeling down, depressed, irritable, or hopeless Not at all   Total Score PHQ 2 0       Learning Assessment:  Learning Assessment 12/18/2012   PRIMARY LEARNER Patient   BARRIERS PRIMARY LEARNER NONE   PRIMARY LANGUAGE ENGLISH   LEARNER PREFERENCE PRIMARY DEMONSTRATION     LISTENING   ANSWERED BY patient   RELATIONSHIP SELF       Abuse Screening:  Abuse Screening Questionnaire 4/16/2021   Do you ever feel afraid of your partner? N   Are you in a relationship with someone who physically or mentally threatens you? N   Is it safe for you to go home? Y       Fall Risk  No flowsheet data found. Pt currently taking Anticoagulant therapy? Warfarin 5mg    Coordination of Care:  1. Have you been to the ER, urgent care clinic since your last visit? Hospitalized since your last visit? no    2. Have you seen or consulted any other health care providers outside of the [address] [place] [address] since your last visit? Include any pap smears or colon screening.  no

## 2021-04-28 ENCOUNTER — OFFICE VISIT (OUTPATIENT)
Dept: CARDIOLOGY CLINIC | Age: 56
End: 2021-04-28
Payer: COMMERCIAL

## 2021-04-28 VITALS
SYSTOLIC BLOOD PRESSURE: 134 MMHG | HEIGHT: 71 IN | DIASTOLIC BLOOD PRESSURE: 84 MMHG | HEART RATE: 65 BPM | WEIGHT: 304 LBS | OXYGEN SATURATION: 96 % | BODY MASS INDEX: 42.56 KG/M2

## 2021-04-28 DIAGNOSIS — Z79.01 LONG TERM CURRENT USE OF ANTICOAGULANT THERAPY: ICD-10-CM

## 2021-04-28 DIAGNOSIS — E66.01 OBESITY, MORBID (HCC): ICD-10-CM

## 2021-04-28 DIAGNOSIS — Z95.2 S/P AORTIC VALVE REPLACEMENT: Primary | ICD-10-CM

## 2021-04-28 DIAGNOSIS — Z95.2 S/P AORTIC VALVE REPLACEMENT: ICD-10-CM

## 2021-04-28 PROCEDURE — 99214 OFFICE O/P EST MOD 30 MIN: CPT | Performed by: INTERNAL MEDICINE

## 2021-04-28 PROCEDURE — 93000 ELECTROCARDIOGRAM COMPLETE: CPT | Performed by: INTERNAL MEDICINE

## 2021-04-28 NOTE — PROGRESS NOTES
HISTORY OF PRESENT ILLNESS  Gita Randolph is a 63 y.o. male. ASSESSMENT and PLAN    Mr. Kyra Allen has history of mechanical aortic valve replacement and single-vessel bypass back in October of 2007 by Dr. Altagracia Cartagena. He also has history of gastric bypass surgery in April of 2009 with initial weight of 345 pounds. His target was 200 pounds. He did reach 220 pounds. Unfortunately, because of severe bilateral knee osteoarthritis, he has not been able to reach his target; his weight is now 276 pounds. For his bilateral knee replacements in June of 2014, he had preoperative pharmacologic nuclear scan to rule out significant ischemia. This was without significant changes. He tolerated his knee replacement surgery well. In July of 2014, he was admitted to [facility] with intractable vomiting from narcotic withdrawal. His troponin was elevated.  He was seen in the emergency room for left sided facial numbness in January 2017. [facility] was diagnosed with Bell's palsy.  CAD:    Clinically stable.  AVR: Clinically stable with crisp valve tones   BP:    Well controlled.  Rhythm:    Stable sinus rhythm.  CHF:    Currently, there is no evidence of decompensated CHF noted.  Weight:    His weight today is 304 pounds. This is without significant change from his previous weight. Unfortunately, he has gained significant amount of weight since after his bariatric surgery.  Cholesterol:  Target LDL<70. Because of gastric bypass surgery, he is unable to tolerate statins.  Anticoagulant:  Remains on ASA, and Coumadin for his AVR. It has been several years since his last echocardiogram.  We will repeat echocardiogram to reassess his AVR. I will see him back in 12 months. Thank you. Encounter Diagnoses   Name Primary?     S/P aortic valve replacement Yes    Long term current use of anticoagulant therapy     Obesity, morbid ([address]      current treatment plan is effective, no change in therapy  lab results and schedule of future lab studies reviewed with patient  reviewed diet, exercise and weight control  cardiovascular risk and specific lipid/LDL goals reviewed  use of aspirin to prevent MI and TIA's discussed      HPI   Today, Mr. Rodrigo Silva has no complaints of chest pains, increased shortness of breath or decreased exercise capacity. He denies any orthopnea or PND. He denies any palpitations or dizziness. Unfortunately, he has not been able lose any significant weight. Review of Systems   Respiratory: Negative for shortness of breath. Cardiovascular: Negative for chest pain, palpitations, orthopnea, claudication, leg swelling and PND. All other systems reviewed and are negative. Physical Exam  Vitals signs and nursing note reviewed. Constitutional:       Appearance: He is obese. HENT:      Head: Normocephalic. Eyes:      Conjunctiva/sclera: Conjunctivae normal.   Neck:      Musculoskeletal: No neck rigidity. Cardiovascular:      Rate and Rhythm: Normal rate and regular rhythm. Pulmonary:      Breath sounds: Normal breath sounds. Abdominal:      Palpations: Abdomen is soft. Musculoskeletal:         General: No swelling. Skin:     General: Skin is warm and dry. Neurological:      General: No focal deficit present. Mental Status: He is alert and oriented to person, place, and time. Psychiatric:         Mood and Affect: Mood normal.         Behavior: Behavior normal.         PCP: Bill Dean MD    Past Medical History:   Diagnosis Date    Allergic asthma     Allergic rhinitis     Anticoagulated on Coumadin     CABG x 1 10/31/2007    SVG to OM and aortic valve replacement by Dr. Giselle Maya CAD (coronary artery disease)     Cardiac cath 08/24/2007    mRCA 30%. LM patent. LAD patent. mCX 45%. LVEDP 40.  EF 35%. LVE. Global hypk. 2+ AI. RA 11.  RV 44/8. PA 45/22. W 34.  CO/CI 7.4/3.0 (TD); 6.8/2.7 ([facility]).     Cardiac echocardiogram 07/15/2014    EF normal.  Septal WMA c/w past CABG. LVH. Mild LAE. Mild JUVENAL. Prosthetic AV w/normal function (mean grad 11 mmHg).  Cardiac nuclear imaging test, mod risk 04/14/2014    Patchy radiotracer uptake. Sm, fixed basal inferior defect most c/w artifact. EF 43%. Mild septal hypk c/w past CABG. Mild mid anterior hypk. Neg EKG on pharm stress test.  Intermediate risk.  Difficulty in voiding     Dr. Cali Grimaldo; On Tamsulosin    Gout     History of aortic valve disease     Hypertension     Hypogonadism, male     Dr. Cali Grimaldo; On TRT via IM injection of testosterone 200 mg every 3 weeks       Hypothyroidism     Left ventricular dilatation     with lower limits of normal left ventricular systolic function.  Obesity, Class II, BMI 35-39.9     Obstructive sleep apnea on CPAP     Osteoarthritis of both knees 6/1/2014    Postoperative anemia due to acute blood loss 6/2/2014    S/P aortic valve replacement 10/31/2007    Dr. Aracelis Velázquez S/P gastric bypass 4/2009    S/P Gastric bypass (4/2009 - Dr. Susu Marks)    Vitamin D deficiency 6/6/2014    Vitamin D 25-Hydroxy 19.9       Past Surgical History:   Procedure Laterality Date    HX AORTIC VALVE REPLACEMENT  10/31/2007    Dr. Dukes Ply  10/31/2007    Dr. Kacie Stuart (SVG to OM)    HX GASTRIC BYPASS  2009    HX HEENT      S/P Rhinoplasty    HX KNEE ARTHROSCOPY Right     2x    HX KNEE REPLACEMENT Bilateral 6/02/2014    S/P Bilateral total knee replacement (6/02/2014 - Dr. Nely James)    HX LUMBAR LAMINECTOMY  1990    [place] GASTRIC BYPASS,OBESITY,W/SM BOWEL RECONS  4/2009    Dr. Susu Marks; 345 pounds with target weight of 200 pounds. Current Outpatient Medications   Medication Sig Dispense Refill    rosuvastatin (CRESTOR) 20 mg tablet Take 20 mg by mouth nightly.       warfarin (COUMADIN) 5 mg tablet TAKE 1 TABLET BY MOUTH EVERY DAY OR AS DIRECTED BY OUR OFFICE 90 Tab 3    losartan (COZAAR) 50 mg tablet TAKE 1 TABLET BY MOUTH TWICE A  Tab 3    albuterol (PROVENTIL HFA, VENTOLIN HFA, PROAIR HFA) 90 mcg/actuation inhaler Take  by inhalation every four (4) hours as needed for Wheezing.  tamsulosin (FLOMAX) 0.4 mg capsule TAKE 1 CAPSULE BY MOUTH ONCE DAILY 90 Cap 3    pediatric multivitamins chewable tablet Take 1 Tab by mouth two (2) times a day.  aspirin 81 mg chewable tablet Take 81 mg by mouth daily (with breakfast).  allopurinol (ZYLOPRIM) 300 mg tablet Take 300 mg by mouth daily.  mometasone (NASONEX) 50 mcg/actuation nasal spray 1 [place] by Both Nostrils route daily.  fluticasone-salmeterol (ADVAIR DISKUS) 100-50 mcg/dose diskus inhaler Take 1 Puff by inhalation every twelve (12) hours. 250-50 mcg instructed to use DOS      levothyroxine (SYNTHROID) 75 mcg tablet Take 75 mcg by mouth Daily (before breakfast). Instructed to take DOS      colchicine 0.6 mg tablet Take 0.6 mg by mouth two (2) times a day. The patient has a family history of    Social History     Tobacco Use    Smoking status: Never Smoker    Smokeless tobacco: Never Used   Substance Use Topics    Alcohol use: No    Drug use: Not on file       Lab Results   Component Value Date/Time    Cholesterol, total 228 (H) 01/06/2017 07:05 PM    HDL Cholesterol 46 01/06/2017 07:05 PM    LDL, calculated 144.2 (H) 01/06/2017 07:05 PM    Triglyceride 189 (H) 01/06/2017 07:05 PM    CHOL/HDL Ratio 5.0 01/06/2017 07:05 PM        BP Readings from Last 3 Encounters:   04/28/21 134/84   06/18/20 138/60   11/07/17 170/88        Pulse Readings from Last 3 Encounters:   04/28/21 65   06/18/20 68   11/07/17 67       Wt Readings from Last 3 Encounters:   04/28/21 137.9 kg (304 lb)   06/18/20 139.7 kg (308 lb)   11/07/17 132 kg (291 lb)         EKG: unchanged from previous tracings, normal sinus rhythm, nonspecific ST and T waves changes, 1st degree AV block, IVCD.

## 2021-04-28 NOTE — PROGRESS NOTES
Chasity Comer presents today for   Chief Complaint   Patient presents with    Follow-up     6 month follow up       Chasity Comer preferred language for health care discussion is english/other. Is someone accompanying this pt? no    Is the patient using any DME equipment during [address]? no    Depression Screening:  3 most recent PHQ Screens 4/28/2021   Little interest or pleasure in doing things Not at all   Feeling down, depressed, irritable, or hopeless Not at all   Total Score PHQ 2 0       Learning Assessment:  Learning Assessment 4/28/2021   PRIMARY LEARNER Patient   BARRIERS PRIMARY LEARNER -   PRIMARY LANGUAGE ENGLISH   LEARNER PREFERENCE PRIMARY DEMONSTRATION     -   ANSWERED BY patient   RELATIONSHIP SELF       Abuse Screening:  Abuse Screening Questionnaire 4/28/2021   Do you ever feel afraid of your partner? N   Are you in a relationship with someone who physically or mentally threatens you? N   Is it safe for you to go home? Y           Pt currently taking Anticoagulant therapy? Warfarin    Pt currently taking Antiplatelet therapy ? ASA 81 mg once a day      Coordination of Care:  1. Have you been to the ER, urgent care clinic since your last visit? Hospitalized since your last visit? no    2. Have you seen or consulted any other health care providers outside of the [address] [place] [address] since your last visit? Include any pap smears or colon screening.  no  '

## 2021-05-11 LAB — INR, EXTERNAL: 3.5

## 2021-05-12 ENCOUNTER — TELEPHONE ANTICOAG (OUTPATIENT)
Dept: CARDIOLOGY CLINIC | Age: 56
End: 2021-05-12

## 2021-05-12 DIAGNOSIS — Z95.2 S/P AORTIC VALVE REPLACEMENT: ICD-10-CM

## 2021-05-12 DIAGNOSIS — Z79.01 LONG TERM CURRENT USE OF ANTICOAGULANT THERAPY: ICD-10-CM

## 2021-05-12 NOTE — PROGRESS NOTES
Verbal order and read back per Du Odom NP  INR above therapeutic range  Hold Coumadin today, then Continue Coumadin 2.5mg daily except 5mg on Mondays and Wednesdays. Recheck in 2 weeks. (only call with dose adjustments)  Patient made aware of dosing and recheck instructions, no questions.

## 2021-05-26 LAB
AV R PG: 95.97 MMHG
ECHO AO ASC DIAM: 3.39 CM
ECHO AO ROOT DIAM: 3.76 CM
ECHO AR MAX VEL PISA: 489.81 CM/S
ECHO AV AREA PEAK VELOCITY: 1.92 CM2
ECHO AV AREA VTI: 2.11 CM2
ECHO AV AREA/BSA PEAK VELOCITY: 0.8 CM2/M2
ECHO AV AREA/BSA VTI: 0.8 CM2/M2
ECHO AV MEAN GRADIENT: 11.93 MMHG
ECHO AV PEAK GRADIENT: 21.72 MMHG
ECHO AV PEAK VELOCITY: 233.03 CM/S
ECHO AV REGURGITANT PHT: 511.68 MS
ECHO AV VTI: 51.45 CM
ECHO IVC PROX: 1.25 CM
ECHO LA AREA 4C: 24.69 CM2
ECHO LA VOL 2C: 88.29 ML (ref 18–58)
ECHO LA VOL 4C: 78.52 ML (ref 18–58)
ECHO LA VOL BP: 88.88 ML (ref 18–58)
ECHO LA VOL/BSA BIPLANE: 35.27 ML/M2 (ref 16–28)
ECHO LA VOLUME INDEX A2C: 35.04 ML/M2 (ref 16–28)
ECHO LA VOLUME INDEX A4C: 31.16 ML/M2 (ref 16–28)
ECHO LV E' LATERAL VELOCITY: 8.64 CM/S
ECHO LV E' SEPTAL VELOCITY: 5.97 CM/S
ECHO LV EDV A2C: 260.79 ML
ECHO LV EDV A4C: 324.27 ML
ECHO LV EDV BP: 289.54 ML (ref 67–155)
ECHO LV EDV INDEX A4C: 128.7 ML/M2
ECHO LV EDV INDEX BP: 114.9 ML/M2
ECHO LV EDV NDEX A2C: 103.5 ML/M2
ECHO LV EJECTION FRACTION A2C: 51 PERCENT
ECHO LV EJECTION FRACTION A4C: 65 PERCENT
ECHO LV EJECTION FRACTION BIPLANE: 56.3 PERCENT (ref 55–100)
ECHO LV ESV A2C: 127.08 ML
ECHO LV ESV A4C: 114.15 ML
ECHO LV ESV BP: 126.65 ML (ref 22–58)
ECHO LV ESV INDEX A2C: 50.4 ML/M2
ECHO LV ESV INDEX A4C: 45.3 ML/M2
ECHO LV ESV INDEX BP: 50.3 ML/M2
ECHO LV INTERNAL DIMENSION DIASTOLIC: 5.53 CM (ref 4.2–5.9)
ECHO LV INTERNAL DIMENSION SYSTOLIC: 4.32 CM
ECHO LV IVSD: 1.71 CM (ref 0.6–1)
ECHO LV MASS 2D: 387.3 G (ref 88–224)
ECHO LV MASS INDEX 2D: 153.7 G/M2 (ref 49–115)
ECHO LV POSTERIOR WALL DIASTOLIC: 1.35 CM (ref 0.6–1)
ECHO LVOT DIAM: 2.53 CM
ECHO LVOT PEAK GRADIENT: 3.2 MMHG
ECHO LVOT PEAK VELOCITY: 89.39 CM/S
ECHO LVOT SV: 108.5 ML
ECHO LVOT SV: 133.7 ML
ECHO LVOT VTI: 21.64 CM
ECHO MV A VELOCITY: 85.79 CM/S
ECHO MV E DECELERATION TIME (DT): 244.82 MS
ECHO MV E VELOCITY: 62.96 CM/S
ECHO MV E/A RATIO: 0.73
ECHO MV E/E' LATERAL: 7.29
ECHO MV E/E' RATIO (AVERAGED): 8.92
ECHO MV E/E' SEPTAL: 10.55
ECHO RV TAPSE: 1.63 CM (ref 1.5–2)
LVOT MG: 1.92 MMHG

## 2021-05-26 NOTE — PROGRESS NOTES
Per your last note : Mr. Shena Castillo has history of mechanical aortic valve replacement and single-vessel bypass back in October of 2007 by Dr. Peter Peter. He also has history of gastric bypass surgery in April of 2009 with initial weight of 345 pounds. His target was 200 pounds. He did reach 220 pounds. Unfortunately, because of severe bilateral knee osteoarthritis, he has not been able to reach his target; his weight is now 276 pounds. For his bilateral knee replacements in June of 2014, he had preoperative pharmacologic nuclear scan to rule out significant ischemia. This was without significant changes. He tolerated his knee replacement surgery well. In July of 2014, he was admitted to [facility] with intractable vomiting from narcotic withdrawal. His troponin was elevated.  He was seen in the emergency room for left sided facial numbness in January 2017. [facility] was diagnosed with Bell's palsy.     · CAD:    Clinically stable. · AVR: Clinically stable with crisp valve tones  · BP:    Well controlled. · Rhythm:    Stable sinus rhythm. · CHF:    Currently, there is no evidence of decompensated CHF noted. · Weight:    His weight today is 304 pounds. This is without significant change from his previous weight. Unfortunately, he has gained significant amount of weight since after his bariatric surgery. · Cholesterol:  Target LDL<70. Because of gastric bypass surgery, he is unable to tolerate statins.   · Anticoagulant:  Remains on ASA, and Coumadin for his AVR.       It has been several years since his last echocardiogram.  We will repeat echocardiogram to reassess his AVR

## 2021-06-24 ENCOUNTER — TELEPHONE ANTICOAG (OUTPATIENT)
Dept: CARDIOLOGY CLINIC | Age: 56
End: 2021-06-24

## 2021-06-24 DIAGNOSIS — Z79.01 LONG TERM CURRENT USE OF ANTICOAGULANT THERAPY: Primary | ICD-10-CM

## 2021-06-24 DIAGNOSIS — Z95.2 S/P AORTIC VALVE REPLACEMENT: ICD-10-CM

## 2021-06-24 LAB — INR, EXTERNAL: 2.3

## 2021-06-24 NOTE — PROGRESS NOTES
Verbal order and read back per Mery Guzman NP  INR within therapeutic range  Continue Coumadin 2.5mg daily except 5mg on Mondays and Wednesdays. Recheck in 2 weeks.  (only call with dose adjustments)

## 2021-06-27 NOTE — PROGRESS NOTES
Verbal order and read back per Iman House NP  iNR within therapeutic range  Continue Coumadin 2.5mg daily except 5mg on Mondays and Wednesdays.  (only call with dose adjustments)
none

## 2021-07-09 RX ORDER — WARFARIN SODIUM 5 MG/1
TABLET ORAL
Qty: 90 TABLET | Refills: 3 | Status: SHIPPED | OUTPATIENT
Start: 2021-07-09 | End: 2022-06-22 | Stop reason: SDUPTHER

## 2021-08-28 LAB — INR, EXTERNAL: 2.4

## 2021-08-30 ENCOUNTER — TELEPHONE ANTICOAG (OUTPATIENT)
Dept: CARDIOLOGY CLINIC | Age: 56
End: 2021-08-30

## 2021-08-30 DIAGNOSIS — Z95.2 S/P AORTIC VALVE REPLACEMENT: ICD-10-CM

## 2021-08-30 DIAGNOSIS — Z79.01 LONG TERM CURRENT USE OF ANTICOAGULANT THERAPY: Primary | ICD-10-CM

## 2021-08-30 NOTE — PROGRESS NOTES
Verbal order and read back per Gibson Prieto NP  INR within therapeutic range  Continue Coumadin 2.5mg daily except 5mg on Mondays and Wednesdays. Recheck in 2 weeks.  (only call with dose adjustments)

## 2021-10-10 LAB — INR, EXTERNAL: 4.7

## 2021-10-11 ENCOUNTER — TELEPHONE ANTICOAG (OUTPATIENT)
Dept: CARDIOLOGY CLINIC | Age: 56
End: 2021-10-11

## 2021-10-11 DIAGNOSIS — Z95.2 S/P AORTIC VALVE REPLACEMENT: ICD-10-CM

## 2021-10-11 DIAGNOSIS — Z79.01 LONG TERM CURRENT USE OF ANTICOAGULANT THERAPY: Primary | ICD-10-CM

## 2021-10-11 NOTE — PROGRESS NOTES
Verbal order and read back per Taan Mccollum MD  INR above therapeutic range - no change in diet or medications. Patient unsure if extra dose mistakenly taken. Hold Coumadin for 2 days, then Continue Coumadin 2.5mg daily except 5mg on Mondays and Wednesdays. Recheck in 2 weeks. (only call with dose adjustments)  Patient made aware of dosing and recheck instructions, no questions.

## 2021-11-02 ENCOUNTER — OFFICE VISIT (OUTPATIENT)
Dept: CARDIOLOGY CLINIC | Age: 56
End: 2021-11-02
Payer: COMMERCIAL

## 2021-11-02 VITALS
HEIGHT: 71 IN | OXYGEN SATURATION: 98 % | HEART RATE: 60 BPM | DIASTOLIC BLOOD PRESSURE: 66 MMHG | WEIGHT: 283 LBS | SYSTOLIC BLOOD PRESSURE: 120 MMHG | BODY MASS INDEX: 39.62 KG/M2

## 2021-11-02 DIAGNOSIS — I25.10 CORONARY ARTERY DISEASE INVOLVING NATIVE CORONARY ARTERY OF NATIVE HEART WITHOUT ANGINA PECTORIS: Primary | ICD-10-CM

## 2021-11-02 PROCEDURE — 93000 ELECTROCARDIOGRAM COMPLETE: CPT | Performed by: INTERNAL MEDICINE

## 2021-11-02 PROCEDURE — 99214 OFFICE O/P EST MOD 30 MIN: CPT | Performed by: INTERNAL MEDICINE

## 2021-11-02 NOTE — PROGRESS NOTES
HISTORY OF PRESENT ILLNESS  Liliane Wade is a 63 y.o. male. ASSESSMENT and PLAN    Mr. Philip Sanchez has history of mechanical aortic valve replacement and single-vessel bypass back in October of 2007 by Dr. Verito Davies. He also has history of gastric bypass surgery in April of 2009 with initial weight of 345 pounds. His target was 200 pounds. He did reach 220 pounds. Unfortunately, because of severe bilateral knee osteoarthritis, he has not been able to reach his target; his weight is now 276 pounds. For his bilateral knee replacements in June of 2014, he had preoperative pharmacologic nuclear scan to rule out significant ischemia. This was without significant changes. He tolerated his knee replacement surgery well. In July of 2014, he was admitted to [facility] with intractable vomiting from narcotic withdrawal. His troponin was elevated.  He was seen in the emergency room for left sided facial numbness in January 2017. [facility] was diagnosed with Bell's palsy. Echocardiogram in May 2021 showed overall normal LV function with EF 55-60%. Mechanical prosthetic aortic valve with mean gradient 11.9 mmHg and MICHELE of 2.1 cm². In August when he 20, he had COVID pneumonia, as well as his wife. · CAD:    Clinically stable. · AVR:   Clinically stable with crisp valve tones  · BP:    Well controlled. · Rhythm:    Stable sinus with first-degree AV block. · CHF:    There is no evidence of decompensated CHF noted. · Weight:     His weight today is 283 pounds. He has lost 21 pounds since 6 months ago. He has started doing NOOM diet which has been successful for him. · Cholesterol:   Target LDL <70. Because of gastric bypass surgery, he is unable to tolerate statins. · Anti-platelet:   Remains on ASA, and Coumadin for his AVR.       Continued weight loss has been recommended and encouraged. All questions answered. I will see him back in 6 months. Thank you.       Encounter Diagnoses Name Primary?  Coronary artery disease involving native coronary artery of native heart without angina pectoris Yes     current treatment plan is effective, no change in therapy  lab results and schedule of future lab studies reviewed with patient  reviewed diet, exercise and weight control  cardiovascular risk and specific lipid/LDL goals reviewed  use of aspirin to prevent MI and TIA's discussed      HPI   Today, Mr. Christian Evans has no complaints of chest pains, increased shortness of breath or decreased exercise capacity. He is in great spirits. By doing NOOM diet, he has lost over 20 pounds. He denies any orthopnea or PND. He denies any palpitations or dizziness. He feels that he has more energy with the weight loss. Review of Systems   Respiratory: Negative for shortness of breath. Cardiovascular: Negative for chest pain, palpitations, orthopnea, claudication, leg swelling and PND. All other systems reviewed and are negative. Physical Exam  Vitals and nursing note reviewed. Constitutional:       Appearance: He is obese. HENT:      Head: Normocephalic. Eyes:      Conjunctiva/sclera: Conjunctivae normal.   Neck:      Vascular: No carotid bruit. Cardiovascular:      Rate and Rhythm: Normal rate and regular rhythm. Heart sounds: Murmur heard. Crescendo decrescendo systolic murmur is present with a grade of 1/6. Comments: Crisp valve tones  Pulmonary:      Breath sounds: Normal breath sounds. Abdominal:      Palpations: Abdomen is soft. Musculoskeletal:         General: No swelling. Cervical back: No rigidity. Skin:     General: Skin is warm and dry. Neurological:      General: No focal deficit present. Mental Status: He is alert and oriented to person, place, and time.    Psychiatric:         Mood and Affect: Mood normal.         Behavior: Behavior normal.         PCP: Toma Cohen MD    Past Medical History:   Diagnosis Date    Allergic asthma  Allergic rhinitis     Anticoagulated on Coumadin     CABG x 1 10/31/2007    SVG to  and aortic valve replacement by Dr. Katharina Sanchez CAD (coronary artery disease)     Cardiac cath 08/24/2007    mRCA 30%. LM patent. LAD patent. mCX 45%. LVEDP 40.  EF 35%. LVE. Global hypk. 2+ AI. RA 11.  RV 44/8. PA 45/22. W 34.  CO/CI 7.4/3.0 (TD); 6.8/2.7 ([facility]).  Cardiac echocardiogram 07/15/2014    EF normal.  Septal WMA c/w past CABG. LVH. Mild LAE. Mild JUVENAL. Prosthetic AV w/normal function (mean grad 11 mmHg).  Cardiac nuclear imaging test, mod risk 04/14/2014    Patchy radiotracer uptake. Sm, fixed basal inferior defect most c/w artifact. EF 43%. Mild septal hypk c/w past CABG. Mild mid anterior hypk. Neg EKG on pharm stress test.  Intermediate risk.  Difficulty in voiding     Dr. Mary Romo; On Tamsulosin    Gout     History of aortic valve disease     Hypertension     Hypogonadism, male     Dr. Mary Romo; On TRT via IM injection of testosterone 200 mg every 3 weeks       Hypothyroidism     Left ventricular dilatation     with lower limits of normal left ventricular systolic function.     Obesity, Class II, BMI 35-39.9     Obstructive sleep apnea on CPAP     Osteoarthritis of both knees 6/1/2014    Postoperative anemia due to acute blood loss 6/2/2014    S/P aortic valve replacement 10/31/2007    Dr. Katharina Sanchez S/P gastric bypass 4/2009    S/P Gastric bypass (4/2009 - Dr. Komal Thomas)    Vitamin D deficiency 6/6/2014    Vitamin D 25-Hydroxy 19.9       Past Surgical History:   Procedure Laterality Date    HX AORTIC VALVE REPLACEMENT  10/31/2007    Dr. Darby Fuel  10/31/2007    Dr. Silavno Vasques (SVG to )    HX GASTRIC BYPASS  2009    HX HEENT      S/P Rhinoplasty    HX KNEE ARTHROSCOPY Right     2x    HX KNEE REPLACEMENT Bilateral 6/02/2014    S/P Bilateral total knee replacement (6/02/2014 - Dr. Bertha Kelley)    HX LUMBAR LAMINECTOMY  1990    DE GASTRIC BYPASS,OBESITY,W/SM BOWEL RECONS  4/2009    Dr. Bernard Mcghee; 345 pounds with target weight of 200 pounds. Current Outpatient Medications   Medication Sig Dispense Refill    warfarin (COUMADIN) 5 mg tablet TAKE 1 TABLET BY MOUTH EVERY DAY OR AS DIRECTED BY OUR OFFICE 90 Tablet 3    rosuvastatin (CRESTOR) 20 mg tablet Take 20 mg by mouth nightly.  losartan (COZAAR) 50 mg tablet TAKE 1 TABLET BY MOUTH TWICE A  Tab 3    albuterol (PROVENTIL HFA, VENTOLIN HFA, PROAIR HFA) 90 mcg/actuation inhaler Take  by inhalation every four (4) hours as needed for Wheezing.  tamsulosin (FLOMAX) 0.4 mg capsule TAKE 1 CAPSULE BY MOUTH ONCE DAILY 90 Cap 3    pediatric multivitamins chewable tablet Take 1 Tab by mouth two (2) times a day.  aspirin 81 mg chewable tablet Take 81 mg by mouth daily (with breakfast).  allopurinol (ZYLOPRIM) 300 mg tablet Take 300 mg by mouth daily.  mometasone (NASONEX) 50 mcg/actuation nasal spray 1 [place] by Both Nostrils route daily.  fluticasone-salmeterol (ADVAIR DISKUS) 100-50 mcg/dose diskus inhaler Take 1 Puff by inhalation every twelve (12) hours. 250-50 mcg instructed to use DOS      levothyroxine (SYNTHROID) 75 mcg tablet Take 75 mcg by mouth Daily (before breakfast). Instructed to take DOS      colchicine 0.6 mg tablet Take 0.6 mg by mouth two (2) times a day.          The patient has a family history of    Social History     Tobacco Use    Smoking status: Never Smoker    Smokeless tobacco: Never Used   Substance Use Topics    Alcohol use: No    Drug use: Not on file       Lab Results   Component Value Date/Time    Cholesterol, total 228 (H) 01/06/2017 07:05 PM    HDL Cholesterol 46 01/06/2017 07:05 PM    LDL, calculated 144.2 (H) 01/06/2017 07:05 PM    Triglyceride 189 (H) 01/06/2017 07:05 PM    CHOL/HDL Ratio 5.0 01/06/2017 07:05 PM        BP Readings from Last 3 Encounters:   11/02/21 120/66   05/26/21 (!) 160/69   04/28/21 134/84        Pulse Readings from Last 3 Encounters:   11/02/21 60   04/28/21 65   06/18/20 68       Wt Readings from Last 3 Encounters:   11/02/21 128.4 kg (283 lb)   05/26/21 137.9 kg (304 lb)   04/28/21 137.9 kg (304 lb)         EKG: unchanged from previous tracings, normal sinus rhythm, nonspecific ST and T waves changes, 1st degree AV block, LAFB.

## 2021-11-02 NOTE — PROGRESS NOTES
Chadd Monzon presents today for   Chief Complaint   Patient presents with    Follow-up     6 month follow up- no complaints        Justin Redmond preferred language for health care discussion is english/other. Is someone accompanying this pt? no    Is the patient using any DME equipment during [address]? no    Depression Screening:  3 most recent PHQ Screens 11/2/2021   Little interest or pleasure in doing things Not at all   Feeling down, depressed, irritable, or hopeless Not at all   Total Score PHQ 2 0       Learning Assessment:  Learning Assessment 4/28/2021   PRIMARY LEARNER Patient   BARRIERS PRIMARY LEARNER -   PRIMARY LANGUAGE ENGLISH   LEARNER PREFERENCE PRIMARY DEMONSTRATION     -   ANSWERED BY patient   RELATIONSHIP SELF       Abuse Screening:  Abuse Screening Questionnaire 11/2/2021   Do you ever feel afraid of your partner? N   Are you in a relationship with someone who physically or mentally threatens you? N   Is it safe for you to go home? Y       Fall Risk  No flowsheet data found. Pt currently taking Anticoagulant therapy? no    Coordination of Care:  1. Have you been to the ER, urgent care clinic since your last visit? Hospitalized since your last visit? no    2. Have you seen or consulted any other health care providers outside of the [address] [place] [address] since your last visit? Include any pap smears or colon screening.  no

## 2021-12-02 ENCOUNTER — TELEPHONE (OUTPATIENT)
Dept: CARDIOLOGY CLINIC | Age: 56
End: 2021-12-02

## 2021-12-02 NOTE — TELEPHONE ENCOUNTER
Patient is overdue to INR home check. Past due 44 days. Patient made aware of need for INR check. Patient states he will have this done this week.

## 2022-03-19 PROBLEM — E66.01 OBESITY, MORBID (HCC): Status: ACTIVE | Noted: 2020-06-18

## 2022-06-22 RX ORDER — WARFARIN SODIUM 5 MG/1
TABLET ORAL
Qty: 90 TABLET | Refills: 3 | Status: SHIPPED | OUTPATIENT
Start: 2022-06-22

## 2022-07-08 ENCOUNTER — ANTI-COAG VISIT (OUTPATIENT)
Dept: CARDIOLOGY CLINIC | Age: 57
End: 2022-07-08
Payer: COMMERCIAL

## 2022-07-08 DIAGNOSIS — Z79.01 LONG TERM CURRENT USE OF ANTICOAGULANT THERAPY: Primary | ICD-10-CM

## 2022-07-08 DIAGNOSIS — Z95.2 S/P AORTIC VALVE REPLACEMENT: ICD-10-CM

## 2022-07-08 LAB
INR BLD: 2.3
PT POC: 27.9 SECONDS
VALID INTERNAL CONTROL?: YES

## 2022-07-08 PROCEDURE — 85610 PROTHROMBIN TIME: CPT | Performed by: NURSE PRACTITIONER

## 2022-07-08 NOTE — PROGRESS NOTES
Patient requested change in home INR monitoring company. Enrollment form for home INR monitoring faxed to [facility] and confirmation fax received.

## 2022-07-08 NOTE — PROGRESS NOTES
The INR is stable and therapeutic. Coumadin 2.5mg daily except 5mg on Mon-Wed-Fri.   Recheck INR in 4 weeks

## 2022-08-05 ENCOUNTER — ANTI-COAG VISIT (OUTPATIENT)
Dept: CARDIOLOGY CLINIC | Age: 57
End: 2022-08-05
Payer: COMMERCIAL

## 2022-08-05 DIAGNOSIS — Z79.01 LONG TERM CURRENT USE OF ANTICOAGULANT THERAPY: Primary | ICD-10-CM

## 2022-08-05 DIAGNOSIS — Z95.2 S/P AORTIC VALVE REPLACEMENT: ICD-10-CM

## 2022-08-05 LAB
INR BLD: 2.2
PT POC: 26.6 SECONDS
VALID INTERNAL CONTROL?: YES

## 2022-08-05 PROCEDURE — 85610 PROTHROMBIN TIME: CPT | Performed by: NURSE PRACTITIONER

## 2022-08-05 NOTE — PROGRESS NOTES
The INR is stable and therapeutic. Taking Coumadin 2.5mg daily except 5mg on Mon-Wed-Fri.    Recheck INR in 5 weeks

## 2022-09-09 ENCOUNTER — ANTI-COAG VISIT (OUTPATIENT)
Dept: CARDIOLOGY CLINIC | Age: 57
End: 2022-09-09
Payer: COMMERCIAL

## 2022-09-09 DIAGNOSIS — Z95.2 S/P AORTIC VALVE REPLACEMENT: ICD-10-CM

## 2022-09-09 DIAGNOSIS — Z79.01 LONG TERM CURRENT USE OF ANTICOAGULANT THERAPY: Primary | ICD-10-CM

## 2022-09-09 LAB
INR BLD: 2
PT POC: 23.6 SECONDS
VALID INTERNAL CONTROL?: YES

## 2022-09-09 PROCEDURE — 85610 PROTHROMBIN TIME: CPT | Performed by: NURSE PRACTITIONER

## 2022-10-14 ENCOUNTER — ANTI-COAG VISIT (OUTPATIENT)
Dept: CARDIOLOGY CLINIC | Age: 57
End: 2022-10-14
Payer: COMMERCIAL

## 2022-10-14 DIAGNOSIS — Z95.2 S/P AORTIC VALVE REPLACEMENT: ICD-10-CM

## 2022-10-14 DIAGNOSIS — Z79.01 LONG TERM CURRENT USE OF ANTICOAGULANT THERAPY: Primary | ICD-10-CM

## 2022-10-14 LAB
INR BLD: 2.4
PT POC: 29.3 SECONDS
VALID INTERNAL CONTROL?: YES

## 2022-10-14 PROCEDURE — 85610 PROTHROMBIN TIME: CPT | Performed by: NURSE PRACTITIONER

## 2022-10-14 NOTE — PATIENT INSTRUCTIONS
The INR is stable and therapeutic.    Continue same dose of coumadin (Continue Coumadin 2.5mg daily except 5mg on Mon-Wed-Fri.)       Currently not doing home monitoring
138

## 2022-11-04 ENCOUNTER — ANTI-COAG VISIT (OUTPATIENT)
Dept: CARDIOLOGY CLINIC | Age: 57
End: 2022-11-04
Payer: COMMERCIAL

## 2022-11-04 DIAGNOSIS — Z95.2 S/P AORTIC VALVE REPLACEMENT: ICD-10-CM

## 2022-11-04 DIAGNOSIS — Z79.01 LONG TERM CURRENT USE OF ANTICOAGULANT THERAPY: Primary | ICD-10-CM

## 2022-11-04 LAB
INR BLD: 4.2
PT POC: 50.3 SECONDS
VALID INTERNAL CONTROL?: YES

## 2022-11-04 PROCEDURE — 85610 PROTHROMBIN TIME: CPT | Performed by: NURSE PRACTITIONER

## 2022-11-04 NOTE — PROGRESS NOTES
The INR is above the therapeutic range. Ask the patient about bleeding complications. Please make the following adjustments to Coumadin dosing: Continue Coumadin 2.5mg daily except 5mg on Mon-Wed-Fri. .  Repeat the INR in 4 weeks.

## 2023-01-20 ENCOUNTER — ANTI-COAG VISIT (OUTPATIENT)
Dept: CARDIOLOGY CLINIC | Age: 58
End: 2023-01-20
Payer: COMMERCIAL

## 2023-01-20 DIAGNOSIS — Z79.01 LONG TERM CURRENT USE OF ANTICOAGULANT THERAPY: Primary | ICD-10-CM

## 2023-01-20 DIAGNOSIS — Z95.2 S/P AORTIC VALVE REPLACEMENT: ICD-10-CM

## 2023-01-20 LAB
INR BLD: 3.3
INR BLD: 3.3
PT POC: 39.1 SECONDS
VALID INTERNAL CONTROL?: YES

## 2023-01-20 NOTE — PROGRESS NOTES
The INR is above the therapeutic range. Ask the patient about bleeding complications. Please make the following adjustments to Coumadin dosing: Hold x 1 then Continue Coumadin 2.5mg daily except 5mg on Mon-Wed-Fri. Repeat the INR in 4 weeks.

## 2023-03-03 ENCOUNTER — ANTI-COAG VISIT (OUTPATIENT)
Age: 58
End: 2023-03-03

## 2023-03-03 DIAGNOSIS — Z95.2 S/P AORTIC VALVE REPLACEMENT: Primary | ICD-10-CM

## 2023-03-03 DIAGNOSIS — Z79.01 LONG TERM CURRENT USE OF ANTICOAGULANT THERAPY: ICD-10-CM

## 2023-03-03 LAB
POC INR: 3.8
PROTHROMBIN TIME, POC: 45.3

## 2023-03-03 NOTE — PROGRESS NOTES
The INR is above the therapeutic range. Just started doxycycline for URI ( he is not sure if it is for 7 or 10 days)  Ask the patient about bleeding complications. Please make the following adjustments to Coumadin dosing: Hold today then continue Coumadin 2.5mg daily except 5mg on Mon-Wed-Fri. Instructed to take 2.5mg daily while on doxycyline and then resume previous dosing schedule  Repeat the INR in 4 weeks.

## 2023-03-31 ENCOUNTER — ANTI-COAG VISIT (OUTPATIENT)
Age: 58
End: 2023-03-31
Payer: COMMERCIAL

## 2023-03-31 DIAGNOSIS — Z79.01 LONG TERM CURRENT USE OF ANTICOAGULANT THERAPY: ICD-10-CM

## 2023-03-31 DIAGNOSIS — Z95.2 S/P AORTIC VALVE REPLACEMENT: Primary | ICD-10-CM

## 2023-03-31 LAB
POC INR: 3.9
PROTHROMBIN TIME, POC: 46.6

## 2023-03-31 PROCEDURE — 85610 PROTHROMBIN TIME: CPT | Performed by: NURSE PRACTITIONER

## 2023-03-31 NOTE — PROGRESS NOTES
The INR is above the therapeutic range. Ask the patient about bleeding complications. Please make the following adjustments to Coumadin dosing: Hold today then decrease Coumadin to 2.5mg daily except 5mg on Mon & Wed. Repeat the INR in 4 weeks.

## 2023-04-28 ENCOUNTER — ANTI-COAG VISIT (OUTPATIENT)
Age: 58
End: 2023-04-28
Payer: COMMERCIAL

## 2023-04-28 DIAGNOSIS — Z79.01 LONG TERM CURRENT USE OF ANTICOAGULANT THERAPY: ICD-10-CM

## 2023-04-28 DIAGNOSIS — Z95.2 S/P AORTIC VALVE REPLACEMENT: Primary | ICD-10-CM

## 2023-04-28 LAB
POC INR: 3.7
PROTHROMBIN TIME, POC: 44.8

## 2023-04-28 PROCEDURE — 85610 PROTHROMBIN TIME: CPT | Performed by: NURSE PRACTITIONER

## 2023-04-28 NOTE — PROGRESS NOTES
The INR is above the therapeutic range. Ask the patient about bleeding complications. Please make the following adjustments to Coumadin dosing: Hold today then decrease Coumadin to 2.5mg daily except 5mg on Wed. Repeat the INR in 5 weeks.

## 2023-06-30 ENCOUNTER — ANTI-COAG VISIT (OUTPATIENT)
Age: 58
End: 2023-06-30
Payer: COMMERCIAL

## 2023-06-30 DIAGNOSIS — Z79.01 LONG TERM CURRENT USE OF ANTICOAGULANT THERAPY: ICD-10-CM

## 2023-06-30 DIAGNOSIS — Z95.2 S/P AORTIC VALVE REPLACEMENT: Primary | ICD-10-CM

## 2023-06-30 LAB
POC INR: 2.3
PROTHROMBIN TIME, POC: 27.1

## 2023-06-30 PROCEDURE — 85610 PROTHROMBIN TIME: CPT | Performed by: NURSE PRACTITIONER

## 2023-07-10 RX ORDER — WARFARIN SODIUM 5 MG/1
TABLET ORAL
Qty: 90 TABLET | Refills: 3 | OUTPATIENT
Start: 2023-07-10

## 2023-09-15 ENCOUNTER — ANTI-COAG VISIT (OUTPATIENT)
Age: 58
End: 2023-09-15

## 2023-09-15 DIAGNOSIS — Z95.2 S/P AORTIC VALVE REPLACEMENT: Primary | ICD-10-CM

## 2023-09-15 DIAGNOSIS — Z79.01 LONG TERM CURRENT USE OF ANTICOAGULANT THERAPY: ICD-10-CM

## 2023-09-15 LAB
POC INR: 2.2
PROTHROMBIN TIME, POC: 25.8

## 2023-09-15 NOTE — PROGRESS NOTES
The INR is stable and therapeutic.    Continue Coumadin to 2.5mg daily except 5mg on Fri   Recheck INR in 1.5 weeks with Dr.JKC burtt

## 2023-09-26 ENCOUNTER — OFFICE VISIT (OUTPATIENT)
Age: 58
End: 2023-09-26
Payer: COMMERCIAL

## 2023-09-26 ENCOUNTER — ANTI-COAG VISIT (OUTPATIENT)
Age: 58
End: 2023-09-26
Payer: COMMERCIAL

## 2023-09-26 VITALS
WEIGHT: 295 LBS | OXYGEN SATURATION: 97 % | HEIGHT: 71 IN | BODY MASS INDEX: 41.3 KG/M2 | SYSTOLIC BLOOD PRESSURE: 148 MMHG | HEART RATE: 57 BPM | DIASTOLIC BLOOD PRESSURE: 82 MMHG

## 2023-09-26 DIAGNOSIS — I25.10 ATHEROSCLEROSIS OF NATIVE CORONARY ARTERY OF NATIVE HEART WITHOUT ANGINA PECTORIS: Primary | ICD-10-CM

## 2023-09-26 DIAGNOSIS — Z79.01 LONG TERM CURRENT USE OF ANTICOAGULANT THERAPY: ICD-10-CM

## 2023-09-26 DIAGNOSIS — Z95.2 S/P AORTIC VALVE REPLACEMENT: ICD-10-CM

## 2023-09-26 DIAGNOSIS — Z95.2 S/P AORTIC VALVE REPLACEMENT: Primary | ICD-10-CM

## 2023-09-26 DIAGNOSIS — R06.02 SOB (SHORTNESS OF BREATH): ICD-10-CM

## 2023-09-26 DIAGNOSIS — Z95.2 PRESENCE OF PROSTHETIC HEART VALVE: ICD-10-CM

## 2023-09-26 LAB
POC INR: 2
PROTHROMBIN TIME, POC: 24.5

## 2023-09-26 PROCEDURE — 3077F SYST BP >= 140 MM HG: CPT | Performed by: INTERNAL MEDICINE

## 2023-09-26 PROCEDURE — 85610 PROTHROMBIN TIME: CPT | Performed by: INTERNAL MEDICINE

## 2023-09-26 PROCEDURE — 99215 OFFICE O/P EST HI 40 MIN: CPT | Performed by: INTERNAL MEDICINE

## 2023-09-26 PROCEDURE — 93000 ELECTROCARDIOGRAM COMPLETE: CPT | Performed by: INTERNAL MEDICINE

## 2023-09-26 PROCEDURE — 3079F DIAST BP 80-89 MM HG: CPT | Performed by: INTERNAL MEDICINE

## 2023-09-26 ASSESSMENT — PATIENT HEALTH QUESTIONNAIRE - PHQ9
SUM OF ALL RESPONSES TO PHQ QUESTIONS 1-9: 0
2. FEELING DOWN, DEPRESSED OR HOPELESS: 0
SUM OF ALL RESPONSES TO PHQ QUESTIONS 1-9: 0
1. LITTLE INTEREST OR PLEASURE IN DOING THINGS: 0
SUM OF ALL RESPONSES TO PHQ9 QUESTIONS 1 & 2: 0

## 2023-09-26 NOTE — PROGRESS NOTES
Sushma Kunz presents today for   Chief Complaint   Patient presents with    Follow-up     6 months       Sushma Kunz preferred language for health care discussion is english/other. Is someone accompanying this pt? no    Is the patient using any DME equipment during OV? no    Depression Screening:  Depression: Not at risk (9/26/2023)    PHQ-2     PHQ-2 Score: 0        Learning Assessment:  Who is the primary learner? Patient    What is the preferred language for health care of the primary learner? ENGLISH    How does the primary learner prefer to learn new concepts? DEMONSTRATION    Answered By patient    Relationship to Learner SELF           Pt currently taking Anticoagulant therapy? no    Pt currently taking Antiplatelet therapy ? Aspirin  warfarin      Coordination of Care:  1. Have you been to the ER, urgent care clinic since your last visit? Hospitalized since your last visit? no    2. Have you seen or consulted any other health care providers outside of the [address] [place] [address] since your last visit? Include any pap smears or colon screening.  no

## 2023-09-26 NOTE — PROGRESS NOTES
Verbal order and read back per Gloria Steve MD    The INR is stable and therapeutic. Continue Coumadin to 2.5mg daily except 5mg on Fri. Recheck in 5 weeks. This has been fully explained to the patient, who indicates understanding.

## 2023-11-06 RX ORDER — WARFARIN SODIUM 5 MG/1
TABLET ORAL
Qty: 90 TABLET | Refills: 3 | Status: SHIPPED | OUTPATIENT
Start: 2023-11-06

## 2023-11-29 ENCOUNTER — ANTI-COAG VISIT (OUTPATIENT)
Age: 58
End: 2023-11-29

## 2023-11-29 DIAGNOSIS — Z79.01 LONG TERM CURRENT USE OF ANTICOAGULANT THERAPY: ICD-10-CM

## 2023-11-29 DIAGNOSIS — Z95.2 S/P AORTIC VALVE REPLACEMENT: Primary | ICD-10-CM

## 2023-11-29 LAB — INR BLD: 2

## 2023-11-29 NOTE — PROGRESS NOTES
Verbal order and read back per [facility] PT INR HV  INR within therapeutic range  Continue Coumadin to 2.5mg daily except 5mg on Fri. Recheck in 5 weeks. Patient made aware of dosing and recheck instructions, no questions.

## 2023-12-18 ENCOUNTER — PREP FOR PROCEDURE (OUTPATIENT)
Age: 58
End: 2023-12-18

## 2023-12-18 DIAGNOSIS — R94.39 ABNORMAL NUCLEAR STRESS TEST: Primary | ICD-10-CM

## 2023-12-18 RX ORDER — SODIUM CHLORIDE 0.9 % (FLUSH) 0.9 %
5-40 SYRINGE (ML) INJECTION PRN
Status: CANCELLED | OUTPATIENT
Start: 2023-12-18

## 2023-12-18 RX ORDER — SODIUM CHLORIDE 9 MG/ML
INJECTION, SOLUTION INTRAVENOUS PRN
Status: CANCELLED | OUTPATIENT
Start: 2023-12-18

## 2023-12-18 RX ORDER — SODIUM CHLORIDE 0.9 % (FLUSH) 0.9 %
5-40 SYRINGE (ML) INJECTION EVERY 12 HOURS SCHEDULED
Status: CANCELLED | OUTPATIENT
Start: 2023-12-18

## 2023-12-28 ENCOUNTER — HOSPITAL ENCOUNTER (OUTPATIENT)
Facility: HOSPITAL | Age: 58
Discharge: HOME | End: 2023-12-28
Payer: COMMERCIAL

## 2023-12-28 ENCOUNTER — HOSPITAL ENCOUNTER (OUTPATIENT)
Facility: HOSPITAL | Age: 58
End: 2023-12-28
Payer: COMMERCIAL

## 2023-12-28 DIAGNOSIS — R94.39 ABNORMAL STRESS TEST: ICD-10-CM

## 2023-12-28 LAB
ALBUMIN SERPL-MCNC: 3.4 G/DL (ref 3.4–5)
ALBUMIN/GLOB SERPL: 1.1 (ref 0.8–1.7)
ALP SERPL-CCNC: 55 U/L (ref 45–117)
ALT SERPL-CCNC: 69 U/L (ref 16–61)
ANION GAP SERPL CALC-SCNC: 5 MMOL/L (ref 3–18)
AST SERPL-CCNC: 42 U/L (ref 10–38)
BILIRUB SERPL-MCNC: 0.9 MG/DL (ref 0.2–1)
BUN SERPL-MCNC: 24 MG/DL (ref 7–18)
BUN/CREAT SERPL: 24 (ref 12–20)
CALCIUM SERPL-MCNC: 9 MG/DL (ref 8.5–10.1)
CHLORIDE SERPL-SCNC: 111 MMOL/L (ref 100–111)
CO2 SERPL-SCNC: 25 MMOL/L (ref 21–32)
CREAT SERPL-MCNC: 1.02 MG/DL (ref 0.6–1.3)
ERYTHROCYTE [DISTWIDTH] IN BLOOD BY AUTOMATED COUNT: 17.1 % (ref 11.6–14.5)
GLOBULIN SER CALC-MCNC: 3.2 G/DL (ref 2–4)
GLUCOSE SERPL-MCNC: 105 MG/DL (ref 74–99)
HCT VFR BLD AUTO: 39.6 % (ref 36–48)
HGB BLD-MCNC: 12.1 G/DL (ref 13–16)
INR PPP: 2.4 (ref 0.9–1.1)
MCH RBC QN AUTO: 27.1 PG (ref 24–34)
MCHC RBC AUTO-ENTMCNC: 30.6 G/DL (ref 31–37)
MCV RBC AUTO: 88.8 FL (ref 78–100)
NRBC # BLD: 0 K/UL (ref 0–0.01)
NRBC BLD-RTO: 0 PER 100 WBC
PLATELET # BLD AUTO: 211 K/UL (ref 135–420)
PMV BLD AUTO: 10.4 FL (ref 9.2–11.8)
POTASSIUM SERPL-SCNC: 4.1 MMOL/L (ref 3.5–5.5)
PROT SERPL-MCNC: 6.6 G/DL (ref 6.4–8.2)
PROTHROMBIN TIME: 25.9 SEC (ref 11.9–14.7)
RBC # BLD AUTO: 4.46 M/UL (ref 4.35–5.65)
SODIUM SERPL-SCNC: 141 MMOL/L (ref 136–145)
WBC # BLD AUTO: 4.9 K/UL (ref 4.6–13.2)

## 2023-12-28 PROCEDURE — 85610 PROTHROMBIN TIME: CPT

## 2023-12-28 PROCEDURE — 71046 X-RAY EXAM CHEST 2 VIEWS: CPT

## 2023-12-28 PROCEDURE — 80053 COMPREHEN METABOLIC PANEL: CPT

## 2023-12-28 PROCEDURE — 85027 COMPLETE CBC AUTOMATED: CPT

## 2023-12-28 PROCEDURE — 36415 COLL VENOUS BLD VENIPUNCTURE: CPT

## 2024-01-11 ENCOUNTER — HOSPITAL ENCOUNTER (OUTPATIENT)
Facility: HOSPITAL | Age: 59
Setting detail: OUTPATIENT SURGERY
Discharge: HOME | End: 2024-01-11
Attending: INTERNAL MEDICINE | Admitting: INTERNAL MEDICINE
Payer: COMMERCIAL

## 2024-01-11 VITALS
TEMPERATURE: 98.3 F | HEIGHT: 71 IN | WEIGHT: 268 LBS | SYSTOLIC BLOOD PRESSURE: 173 MMHG | DIASTOLIC BLOOD PRESSURE: 65 MMHG | OXYGEN SATURATION: 98 % | RESPIRATION RATE: 17 BRPM | BODY MASS INDEX: 37.52 KG/M2 | HEART RATE: 57 BPM

## 2024-01-11 DIAGNOSIS — R79.89 ELEVATED TROPONIN: Primary | ICD-10-CM

## 2024-01-11 DIAGNOSIS — R93.1 ABNORMAL NUCLEAR CARDIAC IMAGING TEST: ICD-10-CM

## 2024-01-11 DIAGNOSIS — R94.39 ABNORMAL NUCLEAR STRESS TEST: ICD-10-CM

## 2024-01-11 LAB
ECHO BSA: 2.47 M2
EKG ATRIAL RATE: 49 BPM
EKG DIAGNOSIS: NORMAL
EKG P AXIS: 73 DEGREES
EKG P-R INTERVAL: 288 MS
EKG Q-T INTERVAL: 492 MS
EKG QRS DURATION: 132 MS
EKG QTC CALCULATION (BAZETT): 444 MS
EKG R AXIS: -31 DEGREES
EKG T AXIS: 119 DEGREES
EKG VENTRICULAR RATE: 49 BPM
INR BLD: 1.2

## 2024-01-11 PROCEDURE — 76000 FLUOROSCOPY <1 HR PHYS/QHP: CPT | Performed by: INTERNAL MEDICINE

## 2024-01-11 PROCEDURE — 6360000002 HC RX W HCPCS: Performed by: INTERNAL MEDICINE

## 2024-01-11 PROCEDURE — 85610 PROTHROMBIN TIME: CPT

## 2024-01-11 PROCEDURE — C9600 PERC DRUG-EL COR STENT SING: HCPCS | Performed by: INTERNAL MEDICINE

## 2024-01-11 PROCEDURE — 93459 L HRT ART/GRFT ANGIO: CPT | Performed by: INTERNAL MEDICINE

## 2024-01-11 PROCEDURE — C1769 GUIDE WIRE: HCPCS | Performed by: INTERNAL MEDICINE

## 2024-01-11 PROCEDURE — C1725 CATH, TRANSLUMIN NON-LASER: HCPCS | Performed by: INTERNAL MEDICINE

## 2024-01-11 PROCEDURE — C1713 ANCHOR/SCREW BN/BN,TIS/BN: HCPCS | Performed by: INTERNAL MEDICINE

## 2024-01-11 PROCEDURE — 2580000003 HC RX 258: Performed by: INTERNAL MEDICINE

## 2024-01-11 PROCEDURE — 2500000003 HC RX 250 WO HCPCS: Performed by: INTERNAL MEDICINE

## 2024-01-11 PROCEDURE — 2709999900 HC NON-CHARGEABLE SUPPLY: Performed by: INTERNAL MEDICINE

## 2024-01-11 PROCEDURE — C1874 STENT, COATED/COV W/DEL SYS: HCPCS | Performed by: INTERNAL MEDICINE

## 2024-01-11 PROCEDURE — 93005 ELECTROCARDIOGRAM TRACING: CPT | Performed by: INTERNAL MEDICINE

## 2024-01-11 PROCEDURE — 93010 ELECTROCARDIOGRAM REPORT: CPT | Performed by: INTERNAL MEDICINE

## 2024-01-11 PROCEDURE — C1894 INTRO/SHEATH, NON-LASER: HCPCS | Performed by: INTERNAL MEDICINE

## 2024-01-11 PROCEDURE — C1887 CATHETER, GUIDING: HCPCS | Performed by: INTERNAL MEDICINE

## 2024-01-11 PROCEDURE — 99153 MOD SED SAME PHYS/QHP EA: CPT | Performed by: INTERNAL MEDICINE

## 2024-01-11 PROCEDURE — 99152 MOD SED SAME PHYS/QHP 5/>YRS: CPT | Performed by: INTERNAL MEDICINE

## 2024-01-11 PROCEDURE — 6360000004 HC RX CONTRAST MEDICATION: Performed by: INTERNAL MEDICINE

## 2024-01-11 DEVICE — STENT ONYXNG40012UX ONYX 4.00X12RX
Type: IMPLANTABLE DEVICE | Status: FUNCTIONAL
Brand: ONYX FRONTIER™

## 2024-01-11 DEVICE — STENT ONYXNG25012UX ONYX 2.50X12RX
Type: IMPLANTABLE DEVICE | Status: FUNCTIONAL
Brand: ONYX FRONTIER™

## 2024-01-11 DEVICE — STENT ONYXNG35015UX ONYX 3.50X15RX
Type: IMPLANTABLE DEVICE | Status: FUNCTIONAL
Brand: ONYX FRONTIER™

## 2024-01-11 DEVICE — STENT ONYXNG30012UX ONYX 3.00X12RX
Type: IMPLANTABLE DEVICE | Status: FUNCTIONAL
Brand: ONYX FRONTIER™

## 2024-01-11 RX ORDER — IODIXANOL 320 MG/ML
INJECTION, SOLUTION INTRAVASCULAR PRN
Status: DISCONTINUED | OUTPATIENT
Start: 2024-01-11 | End: 2024-01-11 | Stop reason: HOSPADM

## 2024-01-11 RX ORDER — SODIUM CHLORIDE 0.9 % (FLUSH) 0.9 %
5-40 SYRINGE (ML) INJECTION EVERY 12 HOURS SCHEDULED
Status: DISCONTINUED | OUTPATIENT
Start: 2024-01-11 | End: 2024-01-11 | Stop reason: HOSPADM

## 2024-01-11 RX ORDER — CLOPIDOGREL BISULFATE 75 MG/1
75 TABLET ORAL DAILY
Qty: 90 TABLET | Refills: 2 | Status: SHIPPED | OUTPATIENT
Start: 2024-01-12

## 2024-01-11 RX ORDER — BIVALIRUDIN 250 MG/5ML
INJECTION, POWDER, LYOPHILIZED, FOR SOLUTION INTRAVENOUS PRN
Status: DISCONTINUED | OUTPATIENT
Start: 2024-01-11 | End: 2024-01-11 | Stop reason: HOSPADM

## 2024-01-11 RX ORDER — SODIUM CHLORIDE 9 MG/ML
INJECTION, SOLUTION INTRAVENOUS PRN
Status: DISCONTINUED | OUTPATIENT
Start: 2024-01-11 | End: 2024-01-11 | Stop reason: HOSPADM

## 2024-01-11 RX ORDER — SODIUM CHLORIDE 0.9 % (FLUSH) 0.9 %
5-40 SYRINGE (ML) INJECTION PRN
Status: DISCONTINUED | OUTPATIENT
Start: 2024-01-11 | End: 2024-01-11 | Stop reason: HOSPADM

## 2024-01-11 RX ORDER — FENTANYL CITRATE 50 UG/ML
INJECTION, SOLUTION INTRAMUSCULAR; INTRAVENOUS PRN
Status: DISCONTINUED | OUTPATIENT
Start: 2024-01-11 | End: 2024-01-11 | Stop reason: HOSPADM

## 2024-01-11 RX ORDER — CLOPIDOGREL BISULFATE 75 MG/1
75 TABLET ORAL DAILY
Status: DISCONTINUED | OUTPATIENT
Start: 2024-01-12 | End: 2024-01-11 | Stop reason: HOSPADM

## 2024-01-11 RX ORDER — VERAPAMIL HYDROCHLORIDE 2.5 MG/ML
INJECTION, SOLUTION INTRAVENOUS PRN
Status: DISCONTINUED | OUTPATIENT
Start: 2024-01-11 | End: 2024-01-11 | Stop reason: HOSPADM

## 2024-01-11 RX ORDER — HEPARIN SODIUM 1000 [USP'U]/ML
INJECTION, SOLUTION INTRAVENOUS; SUBCUTANEOUS PRN
Status: DISCONTINUED | OUTPATIENT
Start: 2024-01-11 | End: 2024-01-11 | Stop reason: HOSPADM

## 2024-01-11 RX ORDER — MIDAZOLAM HYDROCHLORIDE 1 MG/ML
INJECTION INTRAMUSCULAR; INTRAVENOUS PRN
Status: DISCONTINUED | OUTPATIENT
Start: 2024-01-11 | End: 2024-01-11 | Stop reason: HOSPADM

## 2024-01-11 RX ADMIN — SODIUM CHLORIDE 100 ML/HR: 9 INJECTION, SOLUTION INTRAVENOUS at 07:51

## 2024-01-11 NOTE — DISCHARGE INSTRUCTIONS
been diagnosed with angina, and you have angina symptoms that do not go away with rest or are not getting better within 5 minutes after you take one dose of nitroglycerin.   Call your doctor now or seek immediate medical care if:    You are bleeding from the area where the catheter was put in your artery.     You have a fast-growing, painful lump at the catheter site.     You have signs of infection, such as:  Increased pain, swelling, warmth, or redness.  Red streaks leading from the catheter site.  Pus draining from the catheter site.  A fever.     Your leg or hand is painful, looks blue, or feels cold, numb, or tingly.   Watch closely for changes in your health, and be sure to contact your doctor if you have any problems.  Where can you learn more?  Go to https://www.[employer].net/patientEd and enter Q672 to learn more about \"Coronary Angioplasty: What to Expect at Home.\"  Current as of: June 25, 2023               Content Version: 13.9  © 8009-4823 [employer].   Care instructions adapted under license by [employer]. If you have questions about a medical condition or this instruction, always ask your healthcare professional. [employer] disclaims any warranty or liability for your use of this information.  Thank you for enrolling in [employer]. Please follow the instructions below to securely access your online medical record. [employer] allows you to send messages to your doctor, view your test results, renew your prescriptions, schedule appointments, and more.     How Do I Sign Up?  In your Internet browser, go to [email or web address]  Click on the Sign Up Now link in the Sign In box. You will see the New Member Sign Up page.  Enter your [employer] Access Code exactly as it appears below. You will not need to use this code after you’ve completed the sign-up process. If you do not sign up before the expiration date, you must request a new code.  [employer] Access

## 2024-01-11 NOTE — PROGRESS NOTES
AVS Discharge instructions reviewed with patient and copy given to patient.  All questions answered.  Patient verbalized understanding to all discharge instructions.  PIV removed. Procedural site within normal limits.  No hematoma or bleeding noted from procedural and PIV site. No pain noted at discharge.  Patient discharged with support person in stable condition.  Escorted out to vehicle for transport home.

## 2024-01-11 NOTE — H&P
HISTORY OF PRESENT ILLNESS  Scotty Hodge  57 y.o. male           Chief Complaint   Patient presents with    Follow-up       6 months         ASSESSMENT and PLAN     The primary encounter diagnosis was Atherosclerosis of native coronary artery of native heart without angina pectoris. Diagnoses of S/P aortic valve replacement, Long term current use of anticoagulant therapy, Presence of prosthetic heart valve, and SOB (shortness of breath) were also pertinent to this visit.     Mr. Scotty Hodge has history of mechanical aortic valve replacement and single-vessel bypass back in October of 2007 by Dr. Minerva Huerta. He also has history of gastric bypass surgery in April of 2009 with initial weight of 345 pounds. His target was 200 pounds. He did reach 220 pounds. Unfortunately, because of severe bilateral knee osteoarthritis, he has not been able to reach his target; his weight has been increasing gradually.  For his bilateral knee replacements in June of 2014, he had preoperative pharmacologic nuclear scan to rule out significant ischemia. This was without significant changes. He tolerated his knee replacement surgery well.  In July of 2014, he was admitted to [facility] with intractable vomiting from narcotic withdrawal. His troponin was elevated.  He was seen in the emergency room for left sided facial numbness in January 2017.  He was diagnosed with Bell's palsy.  Echocardiogram in May 2021 showed overall normal LV function with EF 55-60%.  Mechanical prosthetic aortic valve with mean gradient 11.9 mmHg and KARINA of 2.1 cm².  In August when he 20, he had COVID pneumonia, as well as his wife.  He was lost to follow-up between November 2021 and September 2023 for unclear reasons.     CAD:    Clinically stable.  AVR:   Clinically stable with crisp valve tones.  BP:    Mildly elevated at 150/92.  Rhythm:    Asymptomatic sinus bradycardia 50 bpm with first-degree AV block.  CHF:    There is no

## 2024-01-11 NOTE — PRE SEDATION
Sedation Plan  ASA: class 2 - patient with mild systemic disease     Mallampati class: II - soft palate, uvula, fauces visible.    Sedation plan: moderate (conscious sedation)    Risks, benefits, and alternatives discussed with patient.

## 2024-01-11 NOTE — PROGRESS NOTES
Pre-Discharge Checklist for Same Day Discharge Post PCI      Confirm that loading dose of P2Y12i has been administered.  YES    Confirm the patient has received prescriptions for at least 30 days of P2Y12i.  YES    Confirm prescription for aspirin and statin.  YES    Confirm referral to cardiac rehab.  YES    Charge nurse plans on calling patient the day after discharge.  YES    The cath holding nurse has provided education to patient on how to monitor access site, and the importance of taking DAPT as prescribed and the specific risks of premature discontinuation.  YES    The cath holding nurse has provided the patient with an emergency number to call.  YES    The cath holding nurse has scheduled a follow up appointment.  YES

## 2024-01-11 NOTE — PROGRESS NOTES
Received from [facility], ambulatory without difficulty, denied any complaints. (+) NPO before midnight..

## 2024-01-23 ENCOUNTER — TELEPHONE (OUTPATIENT)
Age: 59
End: 2024-01-23

## 2024-01-23 NOTE — TELEPHONE ENCOUNTER
Patient left voicemail complaining of nosebleeds. He recently had cardiac catheterization on 1/11. He is taking plavix 75 mg once daily and coumadin 5 mg once daily. Will discuss with Dr. Luis.

## 2024-01-23 NOTE — TELEPHONE ENCOUNTER
Verbal order and read back per Gustavo Luis MD  Keep taking plavix and coumadin as ordered.   Suggested to patient over the counter products available for nosebleeds.   This has been fully explained to the patient, who indicates understanding.

## 2024-02-06 ENCOUNTER — ANTI-COAG VISIT (OUTPATIENT)
Age: 59
End: 2024-02-06

## 2024-02-06 ENCOUNTER — ANTI-COAG VISIT (OUTPATIENT)
Age: 59
End: 2024-02-06
Payer: COMMERCIAL

## 2024-02-06 ENCOUNTER — OFFICE VISIT (OUTPATIENT)
Age: 59
End: 2024-02-06
Payer: COMMERCIAL

## 2024-02-06 VITALS
SYSTOLIC BLOOD PRESSURE: 150 MMHG | DIASTOLIC BLOOD PRESSURE: 70 MMHG | WEIGHT: 264 LBS | BODY MASS INDEX: 36.96 KG/M2 | HEART RATE: 55 BPM | OXYGEN SATURATION: 98 % | HEIGHT: 71 IN

## 2024-02-06 DIAGNOSIS — Z95.2 S/P AORTIC VALVE REPLACEMENT: ICD-10-CM

## 2024-02-06 DIAGNOSIS — Z79.01 LONG TERM CURRENT USE OF ANTICOAGULANT THERAPY: ICD-10-CM

## 2024-02-06 DIAGNOSIS — R06.02 SOB (SHORTNESS OF BREATH): ICD-10-CM

## 2024-02-06 DIAGNOSIS — Z95.2 PRESENCE OF PROSTHETIC HEART VALVE: ICD-10-CM

## 2024-02-06 DIAGNOSIS — I25.10 ATHEROSCLEROSIS OF NATIVE CORONARY ARTERY OF NATIVE HEART WITHOUT ANGINA PECTORIS: ICD-10-CM

## 2024-02-06 DIAGNOSIS — E78.5 HYPERLIPIDEMIA, UNSPECIFIED HYPERLIPIDEMIA TYPE: ICD-10-CM

## 2024-02-06 DIAGNOSIS — Z95.2 S/P AORTIC VALVE REPLACEMENT: Primary | ICD-10-CM

## 2024-02-06 DIAGNOSIS — I25.10 ATHEROSCLEROSIS OF NATIVE CORONARY ARTERY OF NATIVE HEART WITHOUT ANGINA PECTORIS: Primary | ICD-10-CM

## 2024-02-06 DIAGNOSIS — I10 ESSENTIAL (PRIMARY) HYPERTENSION: ICD-10-CM

## 2024-02-06 LAB — INTERNATIONAL NORMALIZATION RATIO, POC: 2

## 2024-02-06 PROCEDURE — 99214 OFFICE O/P EST MOD 30 MIN: CPT | Performed by: NURSE PRACTITIONER

## 2024-02-06 PROCEDURE — 3077F SYST BP >= 140 MM HG: CPT | Performed by: NURSE PRACTITIONER

## 2024-02-06 PROCEDURE — 93000 ELECTROCARDIOGRAM COMPLETE: CPT | Performed by: NURSE PRACTITIONER

## 2024-02-06 PROCEDURE — 3078F DIAST BP <80 MM HG: CPT | Performed by: NURSE PRACTITIONER

## 2024-02-06 RX ORDER — CLOPIDOGREL BISULFATE 75 MG/1
75 TABLET ORAL DAILY
Qty: 90 TABLET | Refills: 2 | Status: SHIPPED | OUTPATIENT
Start: 2024-02-06

## 2024-02-06 ASSESSMENT — PATIENT HEALTH QUESTIONNAIRE - PHQ9
SUM OF ALL RESPONSES TO PHQ QUESTIONS 1-9: 0
2. FEELING DOWN, DEPRESSED OR HOPELESS: 0
SUM OF ALL RESPONSES TO PHQ QUESTIONS 1-9: 0
SUM OF ALL RESPONSES TO PHQ QUESTIONS 1-9: 0
1. LITTLE INTEREST OR PLEASURE IN DOING THINGS: 0
SUM OF ALL RESPONSES TO PHQ QUESTIONS 1-9: 0
SUM OF ALL RESPONSES TO PHQ9 QUESTIONS 1 & 2: 0

## 2024-02-06 ASSESSMENT — ENCOUNTER SYMPTOMS
CHEST TIGHTNESS: 0
CONSTIPATION: 0
SHORTNESS OF BREATH: 0
VOMITING: 0
WHEEZING: 0
NAUSEA: 0
BLOOD IN STOOL: 0
DIARRHEA: 0
ABDOMINAL DISTENTION: 0
COUGH: 0

## 2024-02-06 NOTE — PATIENT INSTRUCTIONS
Continue Coumadin to 2.5mg daily except 5mg on Fri. Recheck in 5 weeks in office   Take Plavix and aspirin daily without fail  All other medications to remain the same  Follow-up with Dr. Luis as scheduled and as needed  Call if BP are consistently greater than 140/90

## 2024-02-06 NOTE — PROGRESS NOTES
Scotty Hodge presents today for   Chief Complaint   Patient presents with    Follow-up     F/u after left heart cath 1/11/24    Palpitations     Extra beats at times     Chest Pain     Rt chest soreness x2 weeks        Scotty Hodge preferred language for health care discussion is english/other.    Is someone accompanying this pt? no    Is the patient using any DME equipment during OV? no    Depression Screening:  Depression: Not at risk (2/6/2024)    PHQ-2     PHQ-2 Score: 0        Learning Assessment:  Who is the primary learner? Patient    What is the preferred language for health care of the primary learner? ENGLISH    How does the primary learner prefer to learn new concepts? DEMONSTRATION    Answered By patient    Relationship to Learner SELF           Pt currently taking Anticoagulant therapy? Warfarin 5 mg every Fri and 2.5 mg on other days     Pt currently taking Antiplatelet therapy ? ASA 81 mg 1x daily and Plavix 75 mg 1x daily       Coordination of Care:  1. Have you been to the ER, urgent care clinic since your last visit? Hospitalized since your last visit? no    2. Have you seen or consulted any other health care providers outside of the [facility] since your last visit? Include any pap smears or colon screening. no    
puff into the lungs in the morning and 1 puff in the evening.      levothyroxine (SYNTHROID) 75 MCG tablet Take 1 tablet by mouth every morning (before breakfast)      losartan (COZAAR) 50 MG tablet TAKE 1 TABLET BY MOUTH TWICE A DAY      rosuvastatin (CRESTOR) 20 MG tablet Take 1 tablet by mouth daily      tamsulosin (FLOMAX) 0.4 MG capsule TAKE 1 CAPSULE BY MOUTH ONCE DAILY      amoxicillin (AMOXIL) 500 MG capsule Take 2 grams 1 hour prior to dental procedure (Patient not taking: Reported on 1/11/2024) 4 capsule 1     No current facility-administered medications for this visit.       Allergies and Sensitivities:  Allergies   Allergen Reactions    Hydromorphone Other (See Comments)     Makes anxious and  Amplifies pain    Nsaids Other (See Comments)     Cannot take because he has a heart valve       Family History:  Family History   Problem Relation Age of Onset    Lung Disease Mother     Heart Disease Mother     Cancer Mother         Lung cancer    Depression Mother     High Cholesterol Mother     High Cholesterol Father     Heart Disease Father     No Known Problems Sister     No Known Problems Brother     No Known Problems Maternal Aunt     No Known Problems Maternal Uncle     No Known Problems Paternal Aunt     No Known Problems Paternal Uncle     No Known Problems Maternal Grandmother     No Known Problems Maternal Grandfather     No Known Problems Paternal Grandmother     No Known Problems Paternal Grandfather     No Known Problems Other        Social History:  He  reports that he has never smoked. He has never used smokeless tobacco.  He  reports no history of alcohol use.    Review of Systems   Constitutional:  Negative for activity change, appetite change, chills, fatigue, fever and unexpected weight change.   Respiratory:  Negative for cough, chest tightness, shortness of breath and wheezing.    Cardiovascular:  Positive for chest pain (secondary to a mechanical fall) and palpitations (occasional).

## 2024-02-06 NOTE — PROGRESS NOTES
Continue Coumadin to 2.5mg daily except 5mg on Fri. Recheck in 5 weeks.    The INR is stable and therapeutic. Continue same dose of coumadin and recheck in 5 weeks in office     Verbal order and read back per JULIO KATHLEEN

## 2024-03-15 ENCOUNTER — ANTI-COAG VISIT (OUTPATIENT)
Age: 59
End: 2024-03-15
Payer: COMMERCIAL

## 2024-03-15 DIAGNOSIS — Z79.01 LONG TERM CURRENT USE OF ANTICOAGULANT THERAPY: ICD-10-CM

## 2024-03-15 DIAGNOSIS — Z95.2 S/P AORTIC VALVE REPLACEMENT: Primary | ICD-10-CM

## 2024-03-15 LAB
POC INR: 1.7
PROTHROMBIN TIME, POC: 20.4

## 2024-03-15 PROCEDURE — 85610 PROTHROMBIN TIME: CPT | Performed by: NURSE PRACTITIONER

## 2024-03-15 NOTE — PROGRESS NOTES
The INR is below the therapeutic range.  Please make the following adjustments to Coumadin dosin.5mg today then Continue Coumadin to 2.5mg daily except 5mg on Fri..  Repeat the INR in 2 weeks with appt

## 2024-03-26 ENCOUNTER — OFFICE VISIT (OUTPATIENT)
Age: 59
End: 2024-03-26
Payer: COMMERCIAL

## 2024-03-26 ENCOUNTER — ANTI-COAG VISIT (OUTPATIENT)
Age: 59
End: 2024-03-26
Payer: COMMERCIAL

## 2024-03-26 VITALS
WEIGHT: 266 LBS | BODY MASS INDEX: 37.24 KG/M2 | HEIGHT: 71 IN | OXYGEN SATURATION: 97 % | SYSTOLIC BLOOD PRESSURE: 142 MMHG | DIASTOLIC BLOOD PRESSURE: 70 MMHG | HEART RATE: 59 BPM

## 2024-03-26 DIAGNOSIS — I25.10 ATHEROSCLEROSIS OF NATIVE CORONARY ARTERY OF NATIVE HEART WITHOUT ANGINA PECTORIS: Primary | ICD-10-CM

## 2024-03-26 DIAGNOSIS — Z79.01 LONG TERM CURRENT USE OF ANTICOAGULANT THERAPY: ICD-10-CM

## 2024-03-26 DIAGNOSIS — Z95.2 PRESENCE OF PROSTHETIC HEART VALVE: ICD-10-CM

## 2024-03-26 DIAGNOSIS — Z95.2 S/P AORTIC VALVE REPLACEMENT: ICD-10-CM

## 2024-03-26 DIAGNOSIS — Z95.2 S/P AORTIC VALVE REPLACEMENT: Primary | ICD-10-CM

## 2024-03-26 LAB
POC INR: 1.7
PROTHROMBIN TIME, POC: 20.6

## 2024-03-26 PROCEDURE — 3077F SYST BP >= 140 MM HG: CPT | Performed by: INTERNAL MEDICINE

## 2024-03-26 PROCEDURE — 85610 PROTHROMBIN TIME: CPT | Performed by: INTERNAL MEDICINE

## 2024-03-26 PROCEDURE — 99214 OFFICE O/P EST MOD 30 MIN: CPT | Performed by: INTERNAL MEDICINE

## 2024-03-26 PROCEDURE — 3078F DIAST BP <80 MM HG: CPT | Performed by: INTERNAL MEDICINE

## 2024-03-26 PROCEDURE — 93000 ELECTROCARDIOGRAM COMPLETE: CPT | Performed by: INTERNAL MEDICINE

## 2024-03-26 ASSESSMENT — ANXIETY QUESTIONNAIRES
4. TROUBLE RELAXING: NOT AT ALL
2. NOT BEING ABLE TO STOP OR CONTROL WORRYING: NOT AT ALL
5. BEING SO RESTLESS THAT IT IS HARD TO SIT STILL: NOT AT ALL
7. FEELING AFRAID AS IF SOMETHING AWFUL MIGHT HAPPEN: NOT AT ALL
3. WORRYING TOO MUCH ABOUT DIFFERENT THINGS: NOT AT ALL
6. BECOMING EASILY ANNOYED OR IRRITABLE: NOT AT ALL
1. FEELING NERVOUS, ANXIOUS, OR ON EDGE: NOT AT ALL
IF YOU CHECKED OFF ANY PROBLEMS ON THIS QUESTIONNAIRE, HOW DIFFICULT HAVE THESE PROBLEMS MADE IT FOR YOU TO DO YOUR WORK, TAKE CARE OF THINGS AT HOME, OR GET ALONG WITH OTHER PEOPLE: NOT DIFFICULT AT ALL
GAD7 TOTAL SCORE: 0

## 2024-03-26 ASSESSMENT — PATIENT HEALTH QUESTIONNAIRE - PHQ9
SUM OF ALL RESPONSES TO PHQ QUESTIONS 1-9: 0
2. FEELING DOWN, DEPRESSED OR HOPELESS: NOT AT ALL
SUM OF ALL RESPONSES TO PHQ QUESTIONS 1-9: 0
1. LITTLE INTEREST OR PLEASURE IN DOING THINGS: NOT AT ALL
SUM OF ALL RESPONSES TO PHQ QUESTIONS 1-9: 0
SUM OF ALL RESPONSES TO PHQ QUESTIONS 1-9: 0
SUM OF ALL RESPONSES TO PHQ9 QUESTIONS 1 & 2: 0

## 2024-03-26 NOTE — PROGRESS NOTES
Verbal order and read back per Gustavo Luis MD    The INR is below the therapeutic range.  5 mg today then Continue Coumadin to 2.5mg daily except 5mg on Fri. Recheck INR in 2 weeks.    This has been fully explained to the patient, who indicates understanding.

## 2024-03-26 NOTE — PROGRESS NOTES
Scotty Hodge presents today for   Chief Complaint   Patient presents with    Follow-up     6 months       Scotty Hodge preferred language for health care discussion is english/other.    Is someone accompanying this pt? no    Is the patient using any DME equipment during OV? no    Depression Screening:  Depression: Not at risk (2/6/2024)    PHQ-2     PHQ-2 Score: 0        Learning Assessment:  Who is the primary learner? Patient    What is the preferred language for health care of the primary learner? ENGLISH    How does the primary learner prefer to learn new concepts? DEMONSTRATION    Answered By patient    Relationship to Learner SELF           Pt currently taking Anticoagulant therapy? Warfarin 5 mg every fri. And 2.5mg all other days.    Pt currently taking Antiplatelet therapy ? Plavix 75 mg daily. Aspirin 81 mg daily      Coordination of Care:  1. Have you been to the ER, urgent care clinic since your last visit? Hospitalized since your last visit? no    2. Have you seen or consulted any other health care providers outside of the [facility] since your last visit? Include any pap smears or colon screening. no    
      PCP: Eladio Marshall MD    Past Medical History:   Diagnosis Date    Abnormal nuclear cardiac imaging test 04/14/2014    Patchy radiotracer uptake.  Sm, fixed basal inferior defect most c/w artifact.  EF 43%.  Mild septal hypk c/w past CABG.  Mild mid anterior hypk.  Neg EKG on pharm stress test.  Intermediate risk.    Allergic asthma     Allergic rhinitis     Anticoagulated on Coumadin     CAD (coronary artery disease)     Difficulty in voiding     Dr. Travis Scott; On Tamsulosin    Gout     History of aortic valve disease     History of echocardiogram 07/15/2014    EF normal.  Septal WMA c/w past CABG.  LVH.  Mild LAE.  Mild AYLIN.  Prosthetic AV w/normal function (mean grad 11 mmHg).      Hypertension     Hypogonadism, male     Dr. Travis Scott; On TRT via IM injection of testosterone 200 mg every 3 weeks       Hypothyroidism     Left ventricular dilatation     with lower limits of normal left ventricular systolic function.    Obesity, Class II, BMI 35-39.9     Obstructive sleep apnea on CPAP     Osteoarthritis of both knees 6/1/2014    Postoperative anemia due to acute blood loss 6/2/2014    S/P aortic valve replacement 10/31/2007    Dr. Kacie Huerta    S/P CABG x 1 10/31/2007    SVG to OM and aortic valve replacement by Dr. Kacie Huerta    S/P cardiac cath 08/24/2007    mRCA 30%.  LM patent.  LAD patent.  mCX 45%.  LVEDP 40.  EF 35%.  LVE.  Global hypk.  2+ AI.  RA 11.  RV 44/8.  PA 45/22.  W 29.  CO/CI 7.4/3.0 (TD); 6.8/2.7 (Jocelynn).    S/P gastric bypass 4/2009    S/P Gastric bypass (4/2009 - Dr. Emilio Elkins)    Vitamin D deficiency 6/6/2014    Vitamin D 25-Hydroxy 19.9       Past Surgical History:   Procedure Laterality Date    AORTIC VALVE REPLACEMENT  10/31/2007    Dr. Kacie Huerta    CARDIAC PROCEDURE N/A 1/11/2024    Left heart cath performed by Gustavo Luis MD at [facility] CARDIAC CATH LAB    CARDIAC PROCEDURE N/A 1/11/2024    Coronary angiography performed by Gustavo Luis MD at [facility] CARDIAC CATH LAB

## 2024-04-20 ENCOUNTER — APPOINTMENT (OUTPATIENT)
Facility: HOSPITAL | Age: 59
End: 2024-04-20
Payer: COMMERCIAL

## 2024-04-20 ENCOUNTER — HOSPITAL ENCOUNTER (EMERGENCY)
Facility: HOSPITAL | Age: 59
Discharge: ANOTHER HOSPITAL | End: 2024-04-20
Attending: STUDENT IN AN ORGANIZED HEALTH CARE EDUCATION/TRAINING PROGRAM
Payer: COMMERCIAL

## 2024-04-20 VITALS
WEIGHT: 277 LBS | SYSTOLIC BLOOD PRESSURE: 110 MMHG | BODY MASS INDEX: 38.78 KG/M2 | HEART RATE: 77 BPM | DIASTOLIC BLOOD PRESSURE: 54 MMHG | RESPIRATION RATE: 15 BRPM | OXYGEN SATURATION: 99 % | TEMPERATURE: 95.5 F | HEIGHT: 71 IN

## 2024-04-20 DIAGNOSIS — E86.1 HYPOTENSION DUE TO HYPOVOLEMIA: ICD-10-CM

## 2024-04-20 DIAGNOSIS — N28.89 HEMORRHAGE OF RIGHT KIDNEY: Primary | ICD-10-CM

## 2024-04-20 LAB
ALBUMIN SERPL-MCNC: 3.7 G/DL (ref 3.4–5)
ALBUMIN/GLOB SERPL: 1.2 (ref 0.8–1.7)
ALP SERPL-CCNC: 64 U/L (ref 45–117)
ALT SERPL-CCNC: 43 U/L (ref 16–61)
ANION GAP SERPL CALC-SCNC: 9 MMOL/L (ref 3–18)
AST SERPL-CCNC: 37 U/L (ref 10–38)
BASOPHILS # BLD: 0.1 K/UL (ref 0–0.1)
BASOPHILS NFR BLD: 1 % (ref 0–2)
BILIRUB SERPL-MCNC: 0.5 MG/DL (ref 0.2–1)
BUN SERPL-MCNC: 33 MG/DL (ref 7–18)
BUN/CREAT SERPL: 23 (ref 12–20)
CALCIUM SERPL-MCNC: 9.4 MG/DL (ref 8.5–10.1)
CHLORIDE SERPL-SCNC: 108 MMOL/L (ref 100–111)
CO2 SERPL-SCNC: 24 MMOL/L (ref 21–32)
CREAT SERPL-MCNC: 1.44 MG/DL (ref 0.6–1.3)
DIFFERENTIAL METHOD BLD: ABNORMAL
EKG ATRIAL RATE: 75 BPM
EKG DIAGNOSIS: NORMAL
EKG P AXIS: 48 DEGREES
EKG P-R INTERVAL: 236 MS
EKG Q-T INTERVAL: 402 MS
EKG QRS DURATION: 124 MS
EKG QTC CALCULATION (BAZETT): 448 MS
EKG R AXIS: -35 DEGREES
EKG T AXIS: 116 DEGREES
EKG VENTRICULAR RATE: 75 BPM
EOSINOPHIL # BLD: 0.4 K/UL (ref 0–0.4)
EOSINOPHIL NFR BLD: 3 % (ref 0–5)
ERYTHROCYTE [DISTWIDTH] IN BLOOD BY AUTOMATED COUNT: 16.8 % (ref 11.6–14.5)
ETHANOL SERPL-MCNC: <3 MG/DL (ref 0–3)
GLOBULIN SER CALC-MCNC: 3.2 G/DL (ref 2–4)
GLUCOSE SERPL-MCNC: 255 MG/DL (ref 74–99)
HCT VFR BLD AUTO: 35.9 % (ref 36–48)
HGB BLD-MCNC: 11.3 G/DL (ref 13–16)
IMM GRANULOCYTES # BLD AUTO: 0 K/UL (ref 0–0.04)
IMM GRANULOCYTES NFR BLD AUTO: 0 % (ref 0–0.5)
INR PPP: 2 (ref 0.9–1.1)
LACTATE BLD-SCNC: 3.61 MMOL/L (ref 0.4–2)
LYMPHOCYTES # BLD: 3.2 K/UL (ref 0.9–3.6)
LYMPHOCYTES NFR BLD: 26 % (ref 21–52)
MAGNESIUM SERPL-MCNC: 1.7 MG/DL (ref 1.6–2.6)
MCH RBC QN AUTO: 27.6 PG (ref 24–34)
MCHC RBC AUTO-ENTMCNC: 31.5 G/DL (ref 31–37)
MCV RBC AUTO: 87.6 FL (ref 78–100)
MONOCYTES # BLD: 0.6 K/UL (ref 0.05–1.2)
MONOCYTES NFR BLD: 4 % (ref 3–10)
NEUTS SEG # BLD: 8.2 K/UL (ref 1.8–8)
NEUTS SEG NFR BLD: 66 % (ref 40–73)
NRBC # BLD: 0 K/UL (ref 0–0.01)
NRBC BLD-RTO: 0 PER 100 WBC
NT PRO BNP: 168 PG/ML (ref 0–900)
PLATELET # BLD AUTO: 291 K/UL (ref 135–420)
PMV BLD AUTO: 10.5 FL (ref 9.2–11.8)
POTASSIUM SERPL-SCNC: 4.1 MMOL/L (ref 3.5–5.5)
PROT SERPL-MCNC: 6.9 G/DL (ref 6.4–8.2)
PROTHROMBIN TIME: 22.7 SEC (ref 11.9–14.7)
RBC # BLD AUTO: 4.1 M/UL (ref 4.35–5.65)
SODIUM SERPL-SCNC: 141 MMOL/L (ref 136–145)
TROPONIN I SERPL HS-MCNC: 33 NG/L (ref 0–78)
WBC # BLD AUTO: 12.4 K/UL (ref 4.6–13.2)

## 2024-04-20 PROCEDURE — 96361 HYDRATE IV INFUSION ADD-ON: CPT

## 2024-04-20 PROCEDURE — 2580000003 HC RX 258: Performed by: STUDENT IN AN ORGANIZED HEALTH CARE EDUCATION/TRAINING PROGRAM

## 2024-04-20 PROCEDURE — 83605 ASSAY OF LACTIC ACID: CPT

## 2024-04-20 PROCEDURE — 84484 ASSAY OF TROPONIN QUANT: CPT

## 2024-04-20 PROCEDURE — 86920 COMPATIBILITY TEST SPIN: CPT

## 2024-04-20 PROCEDURE — 86901 BLOOD TYPING SEROLOGIC RH(D): CPT

## 2024-04-20 PROCEDURE — 83735 ASSAY OF MAGNESIUM: CPT

## 2024-04-20 PROCEDURE — P9016 RBC LEUKOCYTES REDUCED: HCPCS

## 2024-04-20 PROCEDURE — 36430 TRANSFUSION BLD/BLD COMPNT: CPT

## 2024-04-20 PROCEDURE — 86900 BLOOD TYPING SEROLOGIC ABO: CPT

## 2024-04-20 PROCEDURE — 85610 PROTHROMBIN TIME: CPT

## 2024-04-20 PROCEDURE — 6360000004 HC RX CONTRAST MEDICATION: Performed by: STUDENT IN AN ORGANIZED HEALTH CARE EDUCATION/TRAINING PROGRAM

## 2024-04-20 PROCEDURE — 86850 RBC ANTIBODY SCREEN: CPT

## 2024-04-20 PROCEDURE — 83880 ASSAY OF NATRIURETIC PEPTIDE: CPT

## 2024-04-20 PROCEDURE — 93005 ELECTROCARDIOGRAM TRACING: CPT | Performed by: STUDENT IN AN ORGANIZED HEALTH CARE EDUCATION/TRAINING PROGRAM

## 2024-04-20 PROCEDURE — 71275 CT ANGIOGRAPHY CHEST: CPT

## 2024-04-20 PROCEDURE — 96360 HYDRATION IV INFUSION INIT: CPT

## 2024-04-20 PROCEDURE — 6360000002 HC RX W HCPCS

## 2024-04-20 PROCEDURE — 82077 ASSAY SPEC XCP UR&BREATH IA: CPT

## 2024-04-20 PROCEDURE — 99285 EMERGENCY DEPT VISIT HI MDM: CPT

## 2024-04-20 PROCEDURE — 70450 CT HEAD/BRAIN W/O DYE: CPT

## 2024-04-20 PROCEDURE — 96374 THER/PROPH/DIAG INJ IV PUSH: CPT

## 2024-04-20 PROCEDURE — 80053 COMPREHEN METABOLIC PANEL: CPT

## 2024-04-20 PROCEDURE — 93010 ELECTROCARDIOGRAM REPORT: CPT | Performed by: INTERNAL MEDICINE

## 2024-04-20 PROCEDURE — 85025 COMPLETE CBC W/AUTO DIFF WBC: CPT

## 2024-04-20 RX ORDER — 0.9 % SODIUM CHLORIDE 0.9 %
1000 INTRAVENOUS SOLUTION INTRAVENOUS ONCE
Status: COMPLETED | OUTPATIENT
Start: 2024-04-20 | End: 2024-04-20

## 2024-04-20 RX ORDER — ONDANSETRON 2 MG/ML
INJECTION INTRAMUSCULAR; INTRAVENOUS
Status: COMPLETED
Start: 2024-04-20 | End: 2024-04-20

## 2024-04-20 RX ORDER — SODIUM CHLORIDE 9 MG/ML
INJECTION, SOLUTION INTRAVENOUS PRN
Status: DISCONTINUED | OUTPATIENT
Start: 2024-04-20 | End: 2024-04-20

## 2024-04-20 RX ORDER — ONDANSETRON 2 MG/ML
4 INJECTION INTRAMUSCULAR; INTRAVENOUS
Status: COMPLETED | OUTPATIENT
Start: 2024-04-20 | End: 2024-04-20

## 2024-04-20 RX ADMIN — ONDANSETRON 4 MG: 2 INJECTION INTRAMUSCULAR; INTRAVENOUS at 11:33

## 2024-04-20 RX ADMIN — SODIUM CHLORIDE 1000 ML: 9 INJECTION, SOLUTION INTRAVENOUS at 10:23

## 2024-04-20 RX ADMIN — IOPAMIDOL 100 ML: 755 INJECTION, SOLUTION INTRAVENOUS at 10:13

## 2024-04-20 ASSESSMENT — PAIN SCALES - GENERAL
PAINLEVEL_OUTOF10: 6

## 2024-04-20 ASSESSMENT — PAIN - FUNCTIONAL ASSESSMENT: PAIN_FUNCTIONAL_ASSESSMENT: 0-10

## 2024-04-20 NOTE — ED NOTES
TRANSFER - OUT REPORT:    Verbal report given to ANTHONY Garza RN  on Scotty Hodge  being transferred to [facility] ED  for urgent transfer       Report consisted of patient's Situation, Background, Assessment and   Recommendations(SBAR).     Information from the following report(s) ED Encounter Summary was reviewed with the receiving nurse.    Kinder Fall Assessment:                           Lines:   Peripheral IV 04/20/24 Right Antecubital (Active)   Site Assessment Clean, dry & intact 04/20/24 0954   Line Status Blood return noted 04/20/24 0954   Line Care Connections checked and tightened 04/20/24 0954   Phlebitis Assessment No symptoms 04/20/24 0954   Infiltration Assessment 0 04/20/24 0954   Dressing Status Clean, dry & intact 04/20/24 0954   Dressing Type Transparent 04/20/24 0954       Peripheral IV 04/20/24 Right;Anterior Forearm (Active)   Site Assessment Clean, dry & intact 04/20/24 1021       Peripheral IV 04/20/24 Left Forearm (Active)   Site Assessment Clean, dry & intact 04/20/24 1029   Line Status Normal saline locked 04/20/24 1029   Line Care Connections checked and tightened 04/20/24 1029   Phlebitis Assessment No symptoms 04/20/24 1029   Infiltration Assessment 0 04/20/24 1029   Dressing Status Clean, dry & intact 04/20/24 1029   Dressing Type Transparent 04/20/24 1029        Opportunity for questions and clarification was provided.      Patient transported with:  Monitor   O2 3lNC  1L NS  1 unit uncrossed blood

## 2024-04-20 NOTE — ED NOTES
Returned from CT.   Neuro: Patient remains awake and oriented, able to converse with staff and follow commands  Resp: maintained per patient, regular. C/o shortness of breath when laid flat for CT.   Skin: remains pale, diaphoretic

## 2024-04-20 NOTE — ED NOTES
Staff called to waiting room for patient who has passed out. Patient found supine on floor, awake, alert answering questions but pale, diaphoretic.   C/o right lower flank pain which began this morning upon waking up at 0530, worsening.   Cervical spine palpated without patient c/o pain.   Patient able to log roll to sitting and then standing to quickly move to wheelchair.   Brought to bed 2, MD immediately in attendance.

## 2024-04-20 NOTE — ED NOTES
ANTHONY Garza RN at [facility] contacted, states blood transfusion during transport was stopped at 1215.

## 2024-04-20 NOTE — ED NOTES
Ongoing SW/CM Assessment/Plan of Care Note     See SW/CM flowsheets for goals and other objective data.    Progress note:  Clinical review of chart/care rounds done.  Spoke with PT this AM--family is at the bedside for training and decided patient needs too much assistance to be able to go home safely, they are now wanting rehab.    CM met with patient, wife, and caregiver at the bedside, they are all in agreement with the recommendation for rehab.  Provided them with DMG and PAN lists to review, requested they pick at least 2 choices.  Explained anticipate patient is medically to dc today pending rehab arrangements.  Wife states she will review the list and talk with family. Wife states she already spoke with her son Heath and updated him on the rehab plan.  MSW updated on rehab needs.    Current Status  Physical Therapy: Recommends daily skilled therapy for 1-3 hours a day.  Occupational Therapy: Recommends daily skilled therapy for 1-3 hours a day.      Discharge plan:  To [facility] once placement found.    1410 Called back in to room by wife, she states their first choice is  [facility], 2nd is [facility].  MSW updated    CM/SW team to continue to follow for discharge needs.           Patient left unit prior to next vital sign check per blood transfusion and prior to next lactic value. Both transport and receiving nurse given all information on blood unit including rate and next vs check.

## 2024-04-20 NOTE — ED NOTES
Blood hung. Documentation completed on paper flowsheet per uncross matched blood. [facility] blood bank contacted and states they believe this is the best way to document this kind of blood transfusion.

## 2024-04-21 LAB
ABO + RH BLD: NORMAL
BLD PROD TYP BPU: NORMAL
BLOOD BANK BLOOD PRODUCT EXPIRATION DATE: NORMAL
BLOOD BANK DISPENSE STATUS: NORMAL
BLOOD BANK ISBT PRODUCT BLOOD TYPE: 9500
BLOOD BANK UNIT TYPE AND RH: NORMAL
BLOOD GROUP ANTIBODIES SERPL: NORMAL
BPU ID: NORMAL
CROSSMATCH RESULT: NORMAL
SPECIMEN EXP DATE BLD: NORMAL
UNIT DIVISION: 0
UNIT ISSUE DATE/TIME: NORMAL

## 2024-05-10 ENCOUNTER — TELEPHONE (OUTPATIENT)
Age: 59
End: 2024-05-10

## 2024-05-10 DIAGNOSIS — I50.9 HEART FAILURE, UNSPECIFIED HF CHRONICITY, UNSPECIFIED HEART FAILURE TYPE (HCC): ICD-10-CM

## 2024-05-10 DIAGNOSIS — R79.89 ELEVATED SERUM CREATININE: Primary | ICD-10-CM

## 2024-05-10 RX ORDER — FUROSEMIDE 20 MG/1
20 TABLET ORAL DAILY
Qty: 30 TABLET | Refills: 5 | Status: SHIPPED | OUTPATIENT
Start: 2024-05-10

## 2024-05-10 RX ORDER — POTASSIUM CHLORIDE 750 MG/1
10 TABLET, EXTENDED RELEASE ORAL DAILY
Qty: 30 TABLET | Refills: 5 | Status: SHIPPED | OUTPATIENT
Start: 2024-05-10

## 2024-05-10 NOTE — TELEPHONE ENCOUNTER
Patient called stating that he was recently discharged from the hospital for hemorrhagic shock secondary to right renal artery rupture and new onset systolic heart failure. Patient has a post hospital appointment scheduled with ROSMERY Riley on 5/22. He is concerned, however, because he has swelling of his legs, feet, and abdomen, a dry cough, and dyspnea on exertion. He denies weight gain.     This was discussed with ROSMERY Riley.   Verbal order and read back per ANITA Moreno NP    Start lasix 20 mg once daily. Start potassium 10 meq once daily.   Get labs done BMP, BNP next week.   Schedule sooner appointment with Dr. Luis.     This has been fully explained to the patient, who indicates understanding.  Appointment updated. Patient has appt with Dr. Luis for 5/15/24 for follow up.

## 2024-05-14 ENCOUNTER — HOSPITAL ENCOUNTER (OUTPATIENT)
Facility: HOSPITAL | Age: 59
Discharge: HOME | End: 2024-05-17
Payer: COMMERCIAL

## 2024-05-14 DIAGNOSIS — R79.89 ELEVATED SERUM CREATININE: ICD-10-CM

## 2024-05-14 DIAGNOSIS — I50.9 HEART FAILURE, UNSPECIFIED HF CHRONICITY, UNSPECIFIED HEART FAILURE TYPE (HCC): ICD-10-CM

## 2024-05-14 LAB
ANION GAP SERPL CALC-SCNC: 4 MMOL/L (ref 3–18)
BUN SERPL-MCNC: 32 MG/DL (ref 7–18)
BUN/CREAT SERPL: 21 (ref 12–20)
CALCIUM SERPL-MCNC: 8.8 MG/DL (ref 8.5–10.1)
CHLORIDE SERPL-SCNC: 108 MMOL/L (ref 100–111)
CO2 SERPL-SCNC: 26 MMOL/L (ref 21–32)
CREAT SERPL-MCNC: 1.5 MG/DL (ref 0.6–1.3)
GLUCOSE SERPL-MCNC: 128 MG/DL (ref 74–99)
NT PRO BNP: 623 PG/ML (ref 0–900)
POTASSIUM SERPL-SCNC: 3.8 MMOL/L (ref 3.5–5.5)
SODIUM SERPL-SCNC: 138 MMOL/L (ref 136–145)

## 2024-05-14 PROCEDURE — 80048 BASIC METABOLIC PNL TOTAL CA: CPT

## 2024-05-14 PROCEDURE — 83880 ASSAY OF NATRIURETIC PEPTIDE: CPT

## 2024-05-14 PROCEDURE — 36415 COLL VENOUS BLD VENIPUNCTURE: CPT

## 2024-05-15 ENCOUNTER — OFFICE VISIT (OUTPATIENT)
Age: 59
End: 2024-05-15
Payer: COMMERCIAL

## 2024-05-15 VITALS
OXYGEN SATURATION: 97 % | DIASTOLIC BLOOD PRESSURE: 68 MMHG | BODY MASS INDEX: 37.52 KG/M2 | WEIGHT: 268 LBS | SYSTOLIC BLOOD PRESSURE: 136 MMHG | HEART RATE: 73 BPM | HEIGHT: 71 IN

## 2024-05-15 DIAGNOSIS — Z95.2 PRESENCE OF PROSTHETIC HEART VALVE: ICD-10-CM

## 2024-05-15 DIAGNOSIS — I25.10 ATHEROSCLEROSIS OF NATIVE CORONARY ARTERY OF NATIVE HEART WITHOUT ANGINA PECTORIS: Primary | ICD-10-CM

## 2024-05-15 DIAGNOSIS — Z79.01 LONG TERM CURRENT USE OF ANTICOAGULANT THERAPY: ICD-10-CM

## 2024-05-15 PROCEDURE — 93000 ELECTROCARDIOGRAM COMPLETE: CPT | Performed by: INTERNAL MEDICINE

## 2024-05-15 PROCEDURE — 3078F DIAST BP <80 MM HG: CPT | Performed by: INTERNAL MEDICINE

## 2024-05-15 PROCEDURE — 99215 OFFICE O/P EST HI 40 MIN: CPT | Performed by: INTERNAL MEDICINE

## 2024-05-15 PROCEDURE — 3075F SYST BP GE 130 - 139MM HG: CPT | Performed by: INTERNAL MEDICINE

## 2024-05-15 RX ORDER — METOPROLOL SUCCINATE 25 MG/1
25 TABLET, EXTENDED RELEASE ORAL DAILY
COMMUNITY
Start: 2024-05-02

## 2024-05-15 RX ORDER — SITAGLIPTIN 50 MG/1
50 TABLET, FILM COATED ORAL DAILY
COMMUNITY
Start: 2024-05-01

## 2024-05-15 RX ORDER — AMIODARONE HYDROCHLORIDE 200 MG/1
TABLET ORAL
COMMUNITY
Start: 2024-05-02 | End: 2024-06-05

## 2024-05-15 ASSESSMENT — ANXIETY QUESTIONNAIRES
GAD7 TOTAL SCORE: 0
4. TROUBLE RELAXING: NOT AT ALL
3. WORRYING TOO MUCH ABOUT DIFFERENT THINGS: NOT AT ALL
6. BECOMING EASILY ANNOYED OR IRRITABLE: NOT AT ALL
7. FEELING AFRAID AS IF SOMETHING AWFUL MIGHT HAPPEN: NOT AT ALL
2. NOT BEING ABLE TO STOP OR CONTROL WORRYING: NOT AT ALL
IF YOU CHECKED OFF ANY PROBLEMS ON THIS QUESTIONNAIRE, HOW DIFFICULT HAVE THESE PROBLEMS MADE IT FOR YOU TO DO YOUR WORK, TAKE CARE OF THINGS AT HOME, OR GET ALONG WITH OTHER PEOPLE: NOT DIFFICULT AT ALL
5. BEING SO RESTLESS THAT IT IS HARD TO SIT STILL: NOT AT ALL
1. FEELING NERVOUS, ANXIOUS, OR ON EDGE: NOT AT ALL

## 2024-05-15 ASSESSMENT — PATIENT HEALTH QUESTIONNAIRE - PHQ9
1. LITTLE INTEREST OR PLEASURE IN DOING THINGS: NOT AT ALL
SUM OF ALL RESPONSES TO PHQ QUESTIONS 1-9: 0
2. FEELING DOWN, DEPRESSED OR HOPELESS: NOT AT ALL
SUM OF ALL RESPONSES TO PHQ9 QUESTIONS 1 & 2: 0

## 2024-05-15 NOTE — PROGRESS NOTES
Scotty Hodge presents today for   Chief Complaint   Patient presents with    Follow-Up from \[facility]\"" Ayesha preferred language for health care discussion is english/other.    Is someone accompanying this pt? yes    Is the patient using any DME equipment during OV? no    Depression Screening:  Depression: Not at risk (5/15/2024)    PHQ-2     PHQ-2 Score: 0        Learning Assessment:  Who is the primary learner? Patient    What is the preferred language for health care of the primary learner? ENGLISH    How does the primary learner prefer to learn new concepts? DEMONSTRATION    Answered By patient    Relationship to Learner SELF           Pt currently taking Anticoagulant therapy? warfarin    Pt currently taking Antiplatelet therapy ? plavix      Coordination of Care:  1. Have you been to the ER, urgent care clinic since your last visit? Hospitalized since your last visit? yes    2. Have you seen or consulted any other health care providers outside of the [facility] since your last visit? Include any pap smears or colon screening. no

## 2024-05-15 NOTE — PROGRESS NOTES
HISTORY OF PRESENT ILLNESS  Scotty Hodge  58 y.o. male     Chief Complaint   Patient presents with    Follow-Up from Hospital       ASSESSMENT and PLAN    The primary encounter diagnosis was Atherosclerosis of native coronary artery of native heart without angina pectoris. Diagnoses of Long term current use of anticoagulant therapy and Presence of prosthetic heart valve were also pertinent to this visit.    Mr. Scotty Hodge has history of mechanical aortic valve replacement and single-vessel bypass back in October of 2007 by Dr. Kacie Huerta. He also has history of gastric bypass surgery in April of 2009 with initial weight of 345 pounds. His target was 200 pounds. He did reach 220 pounds. Unfortunately, because of severe bilateral knee osteoarthritis, he has not been able to reach his target; his weight has been increasing gradually.  For his bilateral knee replacements in June of 2014, he had preoperative pharmacologic nuclear scan to rule out significant ischemia. This was without significant changes. He tolerated his knee replacement surgery well.  In July of 2014, he was admitted to [facility] with intractable vomiting from narcotic withdrawal. His troponin was elevated.  He was seen in the emergency room for left sided facial numbness in January 2017.  He was diagnosed with Bell's palsy.  Echocardiogram in May 2021 showed overall normal LV function with EF 55-60%.  Mechanical prosthetic aortic valve with mean gradient 11.9 mmHg and KARINA of 2.1 cm².  In August when he 20, he had COVID pneumonia, as well as his wife.  He was lost to follow-up between November 2021 and September 2023 for unclear reasons.  On 4/20/2024, he had sudden right lower back discomfort prompting him to present to the emergency room.  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  He was hospitalized at [facility] until 5/1/2024.  During that hospitalization, he was noted to go into atrial flutter.

## 2024-06-21 ENCOUNTER — HOSPITAL ENCOUNTER (OUTPATIENT)
Facility: HOSPITAL | Age: 59
Discharge: HOME | End: 2024-06-21
Payer: COMMERCIAL

## 2024-06-21 DIAGNOSIS — N40.1 BPH WITH OBSTRUCTION/LOWER URINARY TRACT SYMPTOMS: ICD-10-CM

## 2024-06-21 DIAGNOSIS — N13.8 BPH WITH OBSTRUCTION/LOWER URINARY TRACT SYMPTOMS: ICD-10-CM

## 2024-06-21 DIAGNOSIS — S37.019A PERINEPHRIC HEMATOMA: ICD-10-CM

## 2024-06-21 LAB — CREAT UR-MCNC: 1.6 MG/DL (ref 0.6–1.3)

## 2024-06-21 PROCEDURE — 6360000004 HC RX CONTRAST MEDICATION: Performed by: PHYSICIAN ASSISTANT

## 2024-06-21 PROCEDURE — 74178 CT ABD&PLV WO CNTR FLWD CNTR: CPT

## 2024-06-21 PROCEDURE — 82565 ASSAY OF CREATININE: CPT

## 2024-06-21 RX ADMIN — IOPAMIDOL 80 ML: 755 INJECTION, SOLUTION INTRAVENOUS at 08:45

## 2024-06-27 ENCOUNTER — TELEPHONE (OUTPATIENT)
Age: 59
End: 2024-06-27

## 2024-06-27 NOTE — TELEPHONE ENCOUNTER
----- Message from Bibiana Trujillo sent at 6/14/2024  1:53 PM EDT -----  Patient called and is concerned because he was told he was supposed to be referred to Cardiac Rehab and Dr. Luis did not.  He was hospitalized in [facility], then had a follow up in May with Toby.  He said lasix is not working and he's gained about 20 pounds.  Please look into this and call him.  Thanks.

## 2024-06-27 NOTE — TELEPHONE ENCOUNTER
Called the patient to get more information. He was admitted into the hospital in April 2024 for hemorrhagic shock secondary to right renal artery rupture and new onset systolic heart failure. He was seen by Dr. Luis in office on 05-15-24. He is still having swelling in his lower extremities and having some fatigue. He is currently taking Lasix 20 mg once a day. I made him an appointment with Rosemary on 07-01-24 to address the fatigue and swelling. He is also wanting to go back to work since he has been out since April 2024. Urology already cleared him but he wants to be cleared by cardiology also. Will discuss with Dr. Luis once he is back into the office.

## 2024-06-28 ENCOUNTER — TELEPHONE (OUTPATIENT)
Age: 59
End: 2024-06-28

## 2024-06-28 ENCOUNTER — HOSPITAL ENCOUNTER (OUTPATIENT)
Facility: HOSPITAL | Age: 59
Discharge: HOME | End: 2024-06-28
Payer: COMMERCIAL

## 2024-06-28 DIAGNOSIS — I25.10 ATHEROSCLEROSIS OF NATIVE CORONARY ARTERY OF NATIVE HEART WITHOUT ANGINA PECTORIS: Primary | ICD-10-CM

## 2024-06-28 DIAGNOSIS — R94.39 ABNORMAL STRESS TEST: ICD-10-CM

## 2024-06-28 LAB
ANION GAP SERPL CALC-SCNC: 6 MMOL/L (ref 3–18)
BASOPHILS # BLD: 0 K/UL (ref 0–0.1)
BASOPHILS NFR BLD: 1 % (ref 0–2)
BUN SERPL-MCNC: 42 MG/DL (ref 7–18)
BUN/CREAT SERPL: 24 (ref 12–20)
CALCIUM SERPL-MCNC: 9.4 MG/DL (ref 8.5–10.1)
CHLORIDE SERPL-SCNC: 106 MMOL/L (ref 100–111)
CO2 SERPL-SCNC: 28 MMOL/L (ref 21–32)
CREAT SERPL-MCNC: 1.76 MG/DL (ref 0.6–1.3)
DIFFERENTIAL METHOD BLD: ABNORMAL
EOSINOPHIL # BLD: 0.1 K/UL (ref 0–0.4)
EOSINOPHIL NFR BLD: 2 % (ref 0–5)
ERYTHROCYTE [DISTWIDTH] IN BLOOD BY AUTOMATED COUNT: 17.4 % (ref 11.6–14.5)
GLUCOSE SERPL-MCNC: 76 MG/DL (ref 74–99)
HCT VFR BLD AUTO: 36.1 % (ref 36–48)
HGB BLD-MCNC: 11.2 G/DL (ref 13–16)
IMM GRANULOCYTES # BLD AUTO: 0 K/UL (ref 0–0.04)
IMM GRANULOCYTES NFR BLD AUTO: 1 % (ref 0–0.5)
LYMPHOCYTES # BLD: 1.3 K/UL (ref 0.9–3.6)
LYMPHOCYTES NFR BLD: 22 % (ref 21–52)
MAGNESIUM SERPL-MCNC: 1.9 MG/DL (ref 1.6–2.6)
MCH RBC QN AUTO: 29.4 PG (ref 24–34)
MCHC RBC AUTO-ENTMCNC: 31 G/DL (ref 31–37)
MCV RBC AUTO: 94.8 FL (ref 78–100)
MONOCYTES # BLD: 0.5 K/UL (ref 0.05–1.2)
MONOCYTES NFR BLD: 8 % (ref 3–10)
NEUTS SEG # BLD: 4 K/UL (ref 1.8–8)
NEUTS SEG NFR BLD: 67 % (ref 40–73)
NRBC # BLD: 0 K/UL (ref 0–0.01)
NRBC BLD-RTO: 0 PER 100 WBC
NT PRO BNP: 764 PG/ML (ref 0–900)
PLATELET # BLD AUTO: 267 K/UL (ref 135–420)
PMV BLD AUTO: 9.2 FL (ref 9.2–11.8)
POTASSIUM SERPL-SCNC: 3.7 MMOL/L (ref 3.5–5.5)
RBC # BLD AUTO: 3.81 M/UL (ref 4.35–5.65)
SODIUM SERPL-SCNC: 140 MMOL/L (ref 136–145)
WBC # BLD AUTO: 5.9 K/UL (ref 4.6–13.2)

## 2024-06-28 PROCEDURE — 36415 COLL VENOUS BLD VENIPUNCTURE: CPT

## 2024-06-28 PROCEDURE — 83735 ASSAY OF MAGNESIUM: CPT

## 2024-06-28 PROCEDURE — 83880 ASSAY OF NATRIURETIC PEPTIDE: CPT

## 2024-06-28 PROCEDURE — 80048 BASIC METABOLIC PNL TOTAL CA: CPT

## 2024-06-28 PROCEDURE — 85025 COMPLETE CBC W/AUTO DIFF WBC: CPT

## 2024-06-28 NOTE — TELEPHONE ENCOUNTER
Labs ordered and patient has been called.    This has been fully explained to the patient, who indicates understanding.

## 2024-06-28 NOTE — TELEPHONE ENCOUNTER
----- Message from ANITA Moreno NP sent at 6/28/2024  9:47 AM EDT -----  Evelyn,    I see that Mr. Hodge is on my schedule for Monday, 7/1/24.  Can you please have him get a CBC, BMP, Mg and NT pro-BNP done either today or Sat (if the labs are open on Sat) so they will be ready by the time I see him on Monday?    Thank you,  Rosemary

## 2024-07-01 ENCOUNTER — OFFICE VISIT (OUTPATIENT)
Age: 59
End: 2024-07-01
Payer: COMMERCIAL

## 2024-07-01 VITALS
WEIGHT: 278 LBS | SYSTOLIC BLOOD PRESSURE: 118 MMHG | OXYGEN SATURATION: 97 % | DIASTOLIC BLOOD PRESSURE: 64 MMHG | BODY MASS INDEX: 38.92 KG/M2 | HEIGHT: 71 IN | HEART RATE: 84 BPM

## 2024-07-01 DIAGNOSIS — E78.5 HYPERLIPIDEMIA, UNSPECIFIED HYPERLIPIDEMIA TYPE: ICD-10-CM

## 2024-07-01 DIAGNOSIS — Z95.2 S/P AORTIC VALVE REPLACEMENT: ICD-10-CM

## 2024-07-01 DIAGNOSIS — Z79.01 LONG TERM CURRENT USE OF ANTICOAGULANT THERAPY: ICD-10-CM

## 2024-07-01 DIAGNOSIS — Z95.2 PRESENCE OF PROSTHETIC HEART VALVE: ICD-10-CM

## 2024-07-01 DIAGNOSIS — I10 ESSENTIAL (PRIMARY) HYPERTENSION: ICD-10-CM

## 2024-07-01 DIAGNOSIS — I25.10 ATHEROSCLEROSIS OF NATIVE CORONARY ARTERY OF NATIVE HEART WITHOUT ANGINA PECTORIS: Primary | ICD-10-CM

## 2024-07-01 LAB
POC INR: 5.4
PROTHROMBIN TIME, POC: 64.6

## 2024-07-01 PROCEDURE — 93000 ELECTROCARDIOGRAM COMPLETE: CPT | Performed by: NURSE PRACTITIONER

## 2024-07-01 PROCEDURE — 99214 OFFICE O/P EST MOD 30 MIN: CPT | Performed by: NURSE PRACTITIONER

## 2024-07-01 PROCEDURE — 85610 PROTHROMBIN TIME: CPT | Performed by: NURSE PRACTITIONER

## 2024-07-01 PROCEDURE — 3074F SYST BP LT 130 MM HG: CPT | Performed by: NURSE PRACTITIONER

## 2024-07-01 PROCEDURE — 3078F DIAST BP <80 MM HG: CPT | Performed by: NURSE PRACTITIONER

## 2024-07-01 RX ORDER — WARFARIN SODIUM 2.5 MG/1
2.5 TABLET ORAL DAILY
Qty: 45 TABLET | Refills: 3 | Status: SHIPPED | OUTPATIENT
Start: 2024-07-01

## 2024-07-01 RX ORDER — FUROSEMIDE 40 MG/1
40 TABLET ORAL DAILY
Qty: 45 TABLET | Refills: 5 | Status: SHIPPED | OUTPATIENT
Start: 2024-07-01

## 2024-07-01 RX ORDER — POTASSIUM CHLORIDE 20 MEQ/1
20 TABLET, EXTENDED RELEASE ORAL DAILY
Qty: 45 TABLET | Refills: 5 | Status: SHIPPED | OUTPATIENT
Start: 2024-07-01

## 2024-07-01 ASSESSMENT — PATIENT HEALTH QUESTIONNAIRE - PHQ9
SUM OF ALL RESPONSES TO PHQ QUESTIONS 1-9: 0
2. FEELING DOWN, DEPRESSED OR HOPELESS: NOT AT ALL
SUM OF ALL RESPONSES TO PHQ9 QUESTIONS 1 & 2: 0
1. LITTLE INTEREST OR PLEASURE IN DOING THINGS: NOT AT ALL
SUM OF ALL RESPONSES TO PHQ QUESTIONS 1-9: 0

## 2024-07-01 ASSESSMENT — ENCOUNTER SYMPTOMS: ABDOMINAL DISTENTION: 1

## 2024-07-01 NOTE — PATIENT INSTRUCTIONS
Increase Lasix to 40mg once a day  Increase potassium to 20meq once a day  BMP to be done in 1 week  Coumadin dose as directed on calendar (switched to 2.5mg tablets)  INR in one week  Follow-up with Rosemary as scheduled and as needed  Weigh daily and record  Limit sodium intake to 2000mg per day  Limit fluid intake to no more than 6, eight ounce glasses of any type of fluids per day (total of 48 ounces per day)  Call if you notice sudden or progressive weight gain (3-5 pounds in 2-3 days), increasing shortness of breath, abdominal bloating, increasing lower extremity edema, inability to lie flat or on your normal number of pillows, having to sit up to catch your breath, fatigue, increased somnolence (sleeping more), poor appetite

## 2024-07-01 NOTE — PROGRESS NOTES
Scotty Hodge presents today for   Chief Complaint   Patient presents with    Fatigue    Edema       Scotty Linowenmarie preferred language for health care discussion is english/other.    Is someone accompanying this pt? no    Is the patient using any DME equipment during OV? no    Depression Screening:  Depression: Not at risk (5/15/2024)    PHQ-2     PHQ-2 Score: 0        Learning Assessment:  No question data found.       Pt currently taking Anticoagulant therapy? Warfarin 5 mg    Pt currently taking Antiplatelet therapy ? no      Coordination of Care:  1. Have you been to the ER, urgent care clinic since your last visit? Hospitalized since your last visit? no    2. Have you seen or consulted any other health care providers outside of the [facility] since your last visit? Include any pap smears or colon screening. no    
of breath with exertion, and lower extremity edema/abdominal fullness/bloating.  He states that the edema was present in his thighs but is now located slightly below his knees to his feet and is 2+ pitting.  He also has presacral edema noted.  His weight is up 10 pounds since he saw Dr Luis for a PH follow-up on 5/15/24.  Aside from the edema, he offers no other cardiac complaints except for occasional palpitations. His EKG today shows what appears to be atrial flutter with variable block.     He is currently on lasix 20mg daily and potassium 10meq every other day.  Will increase his lasix to 40mg daily and potassium to 20meq daily.  He was given a lab slip for a BMP to be done in about a week.  Follow-up with me has been scheduled for 3 weeks (or sooner if appointment becomes available).    His INR was checked today and it was 5.4.  He took his coumadin dose already today.  He was instructed to hold for 2 days and his dose will be decreased to 2.5mg daily except 1.25mg on Tues and Thurs.  He is on amiodarone which has impacted his INR.  His INR will be rechecked in one week.  Coumadin 2.5mg tablets prescribed for current use.  I asked that he hold onto his 5mg tablets but put them aside for possible use later.     Congestive heart failure teaching reinforced today.  Advised to limit sodium intake to no more than 2000mg per day and to also limit fluid intake to 48 to 56 ounces per day (unless otherwise specified).  Advised to weigh daily every morning and record weights.  Instructed to call our office if progressive weight gain is noted over a 2 to 3 day period of time, shortness of breath increases, or if abdominal bloating, nausea, fatigue, or increased lower extremity edema is noted.     He wants to return to work.  Return to work form to be completed by staff and then signed by Dr. Luis when he returns to the office.    Time:  35 minutes    He will follow-up with Dr. Luis as scheduled and as

## 2024-07-08 ENCOUNTER — HOSPITAL ENCOUNTER (OUTPATIENT)
Facility: HOSPITAL | Age: 59
Discharge: HOME | End: 2024-07-11
Payer: COMMERCIAL

## 2024-07-08 LAB
ALBUMIN SERPL-MCNC: 3.3 G/DL (ref 3.4–5)
ALBUMIN/GLOB SERPL: 0.9 (ref 0.8–1.7)
ALP SERPL-CCNC: 83 U/L (ref 45–117)
ALT SERPL-CCNC: 57 U/L (ref 16–61)
ANION GAP SERPL CALC-SCNC: 9 MMOL/L (ref 3–18)
AST SERPL-CCNC: 49 U/L (ref 10–38)
BASOPHILS # BLD: 0.1 K/UL (ref 0–0.1)
BASOPHILS NFR BLD: 1 % (ref 0–2)
BILIRUB SERPL-MCNC: 1.3 MG/DL (ref 0.2–1)
BUN SERPL-MCNC: 37 MG/DL (ref 7–18)
BUN/CREAT SERPL: 22 (ref 12–20)
CALCIUM SERPL-MCNC: 9.6 MG/DL (ref 8.5–10.1)
CHLORIDE SERPL-SCNC: 106 MMOL/L (ref 100–111)
CO2 SERPL-SCNC: 25 MMOL/L (ref 21–32)
CREAT SERPL-MCNC: 1.67 MG/DL (ref 0.6–1.3)
DIFFERENTIAL METHOD BLD: ABNORMAL
EOSINOPHIL # BLD: 0.2 K/UL (ref 0–0.4)
EOSINOPHIL NFR BLD: 3 % (ref 0–5)
ERYTHROCYTE [DISTWIDTH] IN BLOOD BY AUTOMATED COUNT: 16.8 % (ref 11.6–14.5)
GLOBULIN SER CALC-MCNC: 3.6 G/DL (ref 2–4)
GLUCOSE SERPL-MCNC: 139 MG/DL (ref 74–99)
HCT VFR BLD AUTO: 38.6 % (ref 36–48)
HGB BLD-MCNC: 11.9 G/DL (ref 13–16)
IMM GRANULOCYTES # BLD AUTO: 0 K/UL (ref 0–0.04)
IMM GRANULOCYTES NFR BLD AUTO: 1 % (ref 0–0.5)
LYMPHOCYTES # BLD: 1.5 K/UL (ref 0.9–3.6)
LYMPHOCYTES NFR BLD: 25 % (ref 21–52)
MAGNESIUM SERPL-MCNC: 1.9 MG/DL (ref 1.6–2.6)
MCH RBC QN AUTO: 29 PG (ref 24–34)
MCHC RBC AUTO-ENTMCNC: 30.8 G/DL (ref 31–37)
MCV RBC AUTO: 94.1 FL (ref 78–100)
MONOCYTES # BLD: 0.5 K/UL (ref 0.05–1.2)
MONOCYTES NFR BLD: 8 % (ref 3–10)
NEUTS SEG # BLD: 4 K/UL (ref 1.8–8)
NEUTS SEG NFR BLD: 64 % (ref 40–73)
NRBC # BLD: 0 K/UL (ref 0–0.01)
NRBC BLD-RTO: 0 PER 100 WBC
NT PRO BNP: 578 PG/ML (ref 0–900)
PLATELET # BLD AUTO: 269 K/UL (ref 135–420)
PMV BLD AUTO: 9.5 FL (ref 9.2–11.8)
POTASSIUM SERPL-SCNC: 3.7 MMOL/L (ref 3.5–5.5)
PROT SERPL-MCNC: 6.9 G/DL (ref 6.4–8.2)
RBC # BLD AUTO: 4.1 M/UL (ref 4.35–5.65)
SODIUM SERPL-SCNC: 140 MMOL/L (ref 136–145)
WBC # BLD AUTO: 6.3 K/UL (ref 4.6–13.2)

## 2024-07-08 PROCEDURE — 83735 ASSAY OF MAGNESIUM: CPT

## 2024-07-08 PROCEDURE — 80053 COMPREHEN METABOLIC PANEL: CPT

## 2024-07-08 PROCEDURE — 85025 COMPLETE CBC W/AUTO DIFF WBC: CPT

## 2024-07-08 PROCEDURE — 36415 COLL VENOUS BLD VENIPUNCTURE: CPT

## 2024-07-08 PROCEDURE — 83880 ASSAY OF NATRIURETIC PEPTIDE: CPT

## 2024-07-09 ENCOUNTER — ANTI-COAG VISIT (OUTPATIENT)
Age: 59
End: 2024-07-09
Payer: COMMERCIAL

## 2024-07-09 DIAGNOSIS — I10 PRIMARY HYPERTENSION: ICD-10-CM

## 2024-07-09 DIAGNOSIS — Z79.01 LONG TERM CURRENT USE OF ANTICOAGULANT THERAPY: ICD-10-CM

## 2024-07-09 DIAGNOSIS — Z95.2 S/P AORTIC VALVE REPLACEMENT: Primary | ICD-10-CM

## 2024-07-09 LAB
POC INR: 2
PROTHROMBIN TIME, POC: 23.5

## 2024-07-09 PROCEDURE — 85610 PROTHROMBIN TIME: CPT | Performed by: NURSE PRACTITIONER

## 2024-07-09 NOTE — PROGRESS NOTES
The INR is stable and therapeutic.  Change Coumadin to 2.5mg daily except 1.25mg on Thurs  Recheck INR in 2 weeks with appt

## 2024-07-15 ENCOUNTER — TELEPHONE (OUTPATIENT)
Age: 59
End: 2024-07-15

## 2024-07-15 NOTE — TELEPHONE ENCOUNTER
Patient called, stating that he needs the most recent office note and his most recent labs sent to his job for disability. He stated that he was told he could return to work 7/25, after his upcoming appointment.     Last office note and labs sent to [phone].

## 2024-07-18 ENCOUNTER — HOSPITAL ENCOUNTER (OUTPATIENT)
Facility: HOSPITAL | Age: 59
Discharge: HOME | End: 2024-07-18
Payer: COMMERCIAL

## 2024-07-18 DIAGNOSIS — I10 PRIMARY HYPERTENSION: ICD-10-CM

## 2024-07-18 LAB
ANION GAP SERPL CALC-SCNC: 7 MMOL/L (ref 3–18)
BUN SERPL-MCNC: 33 MG/DL (ref 7–18)
BUN/CREAT SERPL: 20 (ref 12–20)
CALCIUM SERPL-MCNC: 9.5 MG/DL (ref 8.5–10.1)
CHLORIDE SERPL-SCNC: 107 MMOL/L (ref 100–111)
CO2 SERPL-SCNC: 26 MMOL/L (ref 21–32)
CREAT SERPL-MCNC: 1.65 MG/DL (ref 0.6–1.3)
GLUCOSE SERPL-MCNC: 112 MG/DL (ref 74–99)
POTASSIUM SERPL-SCNC: 3.8 MMOL/L (ref 3.5–5.5)
SODIUM SERPL-SCNC: 140 MMOL/L (ref 136–145)

## 2024-07-18 PROCEDURE — 36415 COLL VENOUS BLD VENIPUNCTURE: CPT

## 2024-07-18 PROCEDURE — 80048 BASIC METABOLIC PNL TOTAL CA: CPT

## 2024-07-21 NOTE — PROGRESS NOTES
Scotty Hodge presents today for follow-up after his lasix was increased to 60mg daily along with potassium chloride 30meq daily.  He states that he is feeling better.  The shortness of breath has improved and occurs only occasionally with strenuous activity.  The lower extremity edema has improved as well.      When I saw him on 7/1/24, he complained of continued fatigue and lower extremity edema.  He stated that the edema had improved since he was discharged from the hospital (was present in his thighs but now located at about calf level and below) but was still present.  His weight was up 10 pounds since his visit with Dr Luis on 5/15/24.  He has had a repeat CT scan of the abdomen and pelvis (as ordered by [facility]) and it still shows mild increased right perinephric fluid collection when compared to 4/20/24 and no evidence of residual active hemorrhage in the right kidney.  He states that he was told that the hematoma has been slow to resolve and another CT scan of the abdomen and pelvis is to be repeated in 3 months.    He is a 58 year old male with history of CAD (s/p CABG x 1 in 2007 by Dr Kacie Huerta), mechanical aortic valve replacement (2007), obesity (s/p gastric bypass in April 2009), bilateral knee osteoarthritis (s/p bilateral knee replacements in 2014), Bell's palsy (Jan. 2017), hypertension, obstructive sleep apnea (uses CPAP), .  He was lost to cardiology follow-up from Nov. 2021 through Sept 2023.  He was seen by Dr. Luis in Sept. 2023.  He was coming in for his protimes/INRs.   He has made dietary changes (gave up carbohydrates) and has lost a little over 30 pounds since his last appointment in Sept 2023.     He had a nuclear stress test done on 11/29/23 and it showed abnormal LV perfusion and evidence of inducible ischemia.  There was a moderate severity, reversible defect in the distal inferolateral segment(s).  The stress test was ordered because he notice increasing fatigue

## 2024-07-24 ENCOUNTER — OFFICE VISIT (OUTPATIENT)
Age: 59
End: 2024-07-24
Payer: COMMERCIAL

## 2024-07-24 VITALS
BODY MASS INDEX: 37.8 KG/M2 | WEIGHT: 270 LBS | DIASTOLIC BLOOD PRESSURE: 70 MMHG | OXYGEN SATURATION: 96 % | HEIGHT: 71 IN | HEART RATE: 60 BPM | SYSTOLIC BLOOD PRESSURE: 138 MMHG

## 2024-07-24 DIAGNOSIS — Z79.01 LONG TERM CURRENT USE OF ANTICOAGULANT THERAPY: ICD-10-CM

## 2024-07-24 DIAGNOSIS — Z95.2 S/P AORTIC VALVE REPLACEMENT: ICD-10-CM

## 2024-07-24 DIAGNOSIS — I48.92 ATRIAL FLUTTER, UNSPECIFIED TYPE (HCC): ICD-10-CM

## 2024-07-24 DIAGNOSIS — I10 PRIMARY HYPERTENSION: Primary | ICD-10-CM

## 2024-07-24 DIAGNOSIS — Z95.2 H/O HEART VALVE REPLACEMENT WITH MECHANICAL VALVE: ICD-10-CM

## 2024-07-24 PROBLEM — N17.9 AKI (ACUTE KIDNEY INJURY) (HCC): Status: ACTIVE | Noted: 2024-04-24

## 2024-07-24 PROBLEM — I48.91 ATRIAL FIBRILLATION WITH RVR (HCC): Status: ACTIVE | Noted: 2024-04-24

## 2024-07-24 PROBLEM — E78.5 HYPERLIPIDEMIA: Status: ACTIVE | Noted: 2020-05-22

## 2024-07-24 PROBLEM — N28.9 RENAL INSUFFICIENCY SYNDROME: Status: ACTIVE | Noted: 2024-05-10

## 2024-07-24 PROBLEM — D62 ACUTE BLOOD LOSS ANEMIA: Status: ACTIVE | Noted: 2024-05-10

## 2024-07-24 PROBLEM — N28.89 RENAL HEMORRHAGE, RIGHT: Status: ACTIVE | Noted: 2024-04-20

## 2024-07-24 PROBLEM — G47.33 OBSTRUCTIVE SLEEP APNEA ON CPAP: Status: ACTIVE | Noted: 2020-03-05

## 2024-07-24 PROBLEM — I50.23 ACUTE ON CHRONIC SYSTOLIC CHF (CONGESTIVE HEART FAILURE) (HCC): Status: ACTIVE | Noted: 2024-04-24

## 2024-07-24 PROBLEM — J30.9 ALLERGIC RHINITIS: Status: ACTIVE | Noted: 2020-03-05

## 2024-07-24 PROBLEM — I25.10 CORONARY ATHEROSCLEROSIS: Status: ACTIVE | Noted: 2020-03-05

## 2024-07-24 PROBLEM — E11.9 TYPE 2 DIABETES MELLITUS WITHOUT COMPLICATION, WITHOUT LONG-TERM CURRENT USE OF INSULIN (HCC): Status: ACTIVE | Noted: 2020-05-22

## 2024-07-24 PROBLEM — L30.9 ECZEMA OF BOTH HANDS: Status: ACTIVE | Noted: 2021-05-14

## 2024-07-24 PROBLEM — R10.11 RIGHT UPPER QUADRANT ABDOMINAL PAIN: Status: ACTIVE | Noted: 2024-05-24

## 2024-07-24 PROBLEM — R60.0 PERIPHERAL EDEMA: Status: ACTIVE | Noted: 2024-05-10

## 2024-07-24 PROBLEM — R57.8 HEMORRHAGIC SHOCK (HCC): Status: ACTIVE | Noted: 2024-04-22

## 2024-07-24 PROBLEM — E29.1 HYPOGONADISM, MALE: Status: ACTIVE | Noted: 2020-03-05

## 2024-07-24 PROBLEM — R74.8 ELEVATED LIVER ENZYMES: Status: ACTIVE | Noted: 2020-05-22

## 2024-07-24 LAB
POC INR: 3.4
PROTHROMBIN TIME, POC: 40.2

## 2024-07-24 PROCEDURE — 99214 OFFICE O/P EST MOD 30 MIN: CPT | Performed by: NURSE PRACTITIONER

## 2024-07-24 PROCEDURE — 85610 PROTHROMBIN TIME: CPT | Performed by: NURSE PRACTITIONER

## 2024-07-24 PROCEDURE — 3075F SYST BP GE 130 - 139MM HG: CPT | Performed by: NURSE PRACTITIONER

## 2024-07-24 PROCEDURE — 3078F DIAST BP <80 MM HG: CPT | Performed by: NURSE PRACTITIONER

## 2024-07-24 RX ORDER — POTASSIUM CHLORIDE 20 MEQ/1
30 TABLET, EXTENDED RELEASE ORAL DAILY
Qty: 1 TABLET | Refills: 0
Start: 2024-07-24

## 2024-07-24 RX ORDER — FUROSEMIDE 40 MG/1
60 TABLET ORAL DAILY
Qty: 1 TABLET | Refills: 0
Start: 2024-07-24

## 2024-07-24 NOTE — PATIENT INSTRUCTIONS
Continue Lasix 60mg once a day  Continue potassium chloride 30meq once a day  BMP, Mg to be done in 2 weeks  Follow-up with Dr. Luis as scheduled and as needed

## 2024-07-24 NOTE — PROGRESS NOTES
Scotty Hodge presents today for   Chief Complaint   Patient presents with    Follow-up     After lasix increased        Scotty Hodge preferred language for health care discussion is english/other.    Is someone accompanying this pt? no    Is the patient using any DME equipment during OV? no    Depression Screening:  Depression: Not at risk (7/24/2024)    PHQ-2     PHQ-2 Score: 0        Learning Assessment:  Who is the primary learner? Patient    What is the preferred language for health care of the primary learner? ENGLISH    How does the primary learner prefer to learn new concepts? DEMONSTRATION    Answered By patient    Relationship to Learner SELF           Pt currently taking Anticoagulant therapy? Warfarin 2.5 mg daily except 1,25 mg thursdays    Pt currently taking Antiplatelet therapy ? Plavix 75 mg daily      Coordination of Care:  1. Have you been to the ER, urgent care clinic since your last visit? Hospitalized since your last visit? no    2. Have you seen or consulted any other health care providers outside of the [facility] since your last visit? Include any pap smears or colon screening. no

## 2024-08-10 ENCOUNTER — HOSPITAL ENCOUNTER (OUTPATIENT)
Facility: HOSPITAL | Age: 59
End: 2024-08-10
Payer: COMMERCIAL

## 2024-08-10 DIAGNOSIS — S37.019A PERINEPHRIC HEMATOMA: ICD-10-CM

## 2024-08-10 PROCEDURE — 6360000004 HC RX CONTRAST MEDICATION: Performed by: PHYSICIAN ASSISTANT

## 2024-08-10 PROCEDURE — 82565 ASSAY OF CREATININE: CPT

## 2024-08-10 PROCEDURE — 74178 CT ABD&PLV WO CNTR FLWD CNTR: CPT

## 2024-08-10 RX ORDER — IODIXANOL 320 MG/ML
80 INJECTION, SOLUTION INTRAVASCULAR
Status: COMPLETED | OUTPATIENT
Start: 2024-08-10 | End: 2024-08-10

## 2024-08-10 RX ADMIN — IODIXANOL 80 ML: 320 INJECTION, SOLUTION INTRAVASCULAR at 10:44

## 2024-08-12 LAB — CREAT UR-MCNC: 1.8 MG/DL (ref 0.6–1.3)

## 2024-08-14 ENCOUNTER — ANTI-COAG VISIT (OUTPATIENT)
Age: 59
End: 2024-08-14
Payer: COMMERCIAL

## 2024-08-14 ENCOUNTER — OFFICE VISIT (OUTPATIENT)
Age: 59
End: 2024-08-14
Payer: COMMERCIAL

## 2024-08-14 VITALS
DIASTOLIC BLOOD PRESSURE: 64 MMHG | HEART RATE: 90 BPM | OXYGEN SATURATION: 96 % | BODY MASS INDEX: 37.52 KG/M2 | WEIGHT: 268 LBS | SYSTOLIC BLOOD PRESSURE: 130 MMHG | HEIGHT: 71 IN

## 2024-08-14 DIAGNOSIS — Z95.2 H/O HEART VALVE REPLACEMENT WITH MECHANICAL VALVE: ICD-10-CM

## 2024-08-14 DIAGNOSIS — I10 PRIMARY HYPERTENSION: Primary | ICD-10-CM

## 2024-08-14 DIAGNOSIS — Z95.2 S/P AORTIC VALVE REPLACEMENT: ICD-10-CM

## 2024-08-14 DIAGNOSIS — Z79.01 LONG TERM CURRENT USE OF ANTICOAGULANT THERAPY: ICD-10-CM

## 2024-08-14 DIAGNOSIS — Z95.2 S/P AORTIC VALVE REPLACEMENT: Primary | ICD-10-CM

## 2024-08-14 DIAGNOSIS — I48.92 ATRIAL FLUTTER, UNSPECIFIED TYPE (HCC): ICD-10-CM

## 2024-08-14 LAB
POC INR: 2.8
PROTHROMBIN TIME, POC: 33.6

## 2024-08-14 PROCEDURE — 85610 PROTHROMBIN TIME: CPT | Performed by: INTERNAL MEDICINE

## 2024-08-14 PROCEDURE — 99214 OFFICE O/P EST MOD 30 MIN: CPT | Performed by: INTERNAL MEDICINE

## 2024-08-14 PROCEDURE — 3078F DIAST BP <80 MM HG: CPT | Performed by: INTERNAL MEDICINE

## 2024-08-14 PROCEDURE — 3075F SYST BP GE 130 - 139MM HG: CPT | Performed by: INTERNAL MEDICINE

## 2024-08-14 PROCEDURE — 93000 ELECTROCARDIOGRAM COMPLETE: CPT | Performed by: INTERNAL MEDICINE

## 2024-08-14 ASSESSMENT — PATIENT HEALTH QUESTIONNAIRE - PHQ9
1. LITTLE INTEREST OR PLEASURE IN DOING THINGS: NOT AT ALL
SUM OF ALL RESPONSES TO PHQ QUESTIONS 1-9: 0
SUM OF ALL RESPONSES TO PHQ9 QUESTIONS 1 & 2: 0
2. FEELING DOWN, DEPRESSED OR HOPELESS: NOT AT ALL
SUM OF ALL RESPONSES TO PHQ QUESTIONS 1-9: 0

## 2024-08-14 NOTE — PROGRESS NOTES
Verbal order and read back per Gustavo Luis MD    The INR is stable and therapeutic.   Continue Coumadin to 2.5mg daily except 1.25mg on Thurs. Recheck in 2 weeks.    This has been fully explained to the patient, who indicates understanding.

## 2024-08-14 NOTE — PROGRESS NOTES
Scotty Hodge presents today for   Chief Complaint   Patient presents with    Follow-up     3 month f/u    Palpitations     Racing     Edema     Bilateral ankle edema on/off       Scotty Hodge preferred language for health care discussion is english/other.    Is someone accompanying this pt? no    Is the patient using any DME equipment during OV? no    Depression Screening:  Depression: Not at risk (8/14/2024)    PHQ-2     PHQ-2 Score: 0        Learning Assessment:  Who is the primary learner? Patient    What is the preferred language for health care of the primary learner? ENGLISH    How does the primary learner prefer to learn new concepts? DEMONSTRATION    Answered By patient    Relationship to Learner SELF           Pt currently taking Anticoagulant therapy? Warfari 2.5 mg daily except 1.25 mg on Thurs    Pt currently taking Antiplatelet therapy ? Plavix 75 mg 1x daily       Coordination of Care:  1. Have you been to the ER, urgent care clinic since your last visit? Hospitalized since your last visit? no    2. Have you seen or consulted any other health care providers outside of the [facility] since your last visit? Include any pap smears or colon screening. no    
at [facility] until 5/1/2024.  During that hospitalization, he was noted to go into atrial flutter.  His echocardiogram during that hospitalization showed EF down to 35% with well-functioning mechanical aortic valve with mean gradient of 15 mmHg.    Medication regimen reviewed and current cardiac regimen will be continued.  All new testing since the last office visit was independently reviewed by me.    CAD:    Clinically stable.  AVR:   Clinically stable with crisp valve tones.  Atrial flutter:    Rate controlled at 90 bpm on current medication regimen.  He continues on Coumadin for his mechanical aortic valve.  HTN:     Acceptable at 130/64.  Continue current antihypertensive regimen.  CHF:    There is no evidence of decompensated CHF noted.  BMI:     His weight today is 268 pounds.  His baseline weight is 267 pounds.  He would like to get down to less than 250 pounds.  Hyperlipidemia:   Target LDL <70.   Because of gastric bypass surgery, he is unable to tolerate statins.  Anti-platelet:   Remains on Plavix, and Coumadin for his AVR.       He continues to recover from his right renal artery rupture and subsequent hemorrhagic shock.  He is tolerating his current cardiac medication regimen which will be continued.  I will see him back in 6 months. Thank you.    Orders Placed This Encounter   Procedures    EKG 12 Lead     Order Specific Question:   Reason for Exam?     Answer:   Other        Palpitations     Edema      Today, Mr. Hodge has no complaints of chest pains, increased shortness of breath or decreased exercise capacity.  He has returned to his typical activity level.  He is exercising by walking most evenings with his wife.  He has not noted any recurrent episodes of chest pains or increased dyspnea.  He denies any PND.  Denies any palpitations or dizziness.    Review of Systems  14 point review of system is negative unless mentioned in HPI    Physical Exam  Vitals and nursing note reviewed.

## 2024-08-18 DIAGNOSIS — R79.89 OTHER SPECIFIED ABNORMAL FINDINGS OF BLOOD CHEMISTRY: ICD-10-CM

## 2024-08-20 RX ORDER — WARFARIN SODIUM 5 MG/1
TABLET ORAL
Qty: 90 TABLET | Refills: 3 | OUTPATIENT
Start: 2024-08-20

## 2024-08-20 RX ORDER — CLOPIDOGREL BISULFATE 75 MG/1
75 TABLET ORAL DAILY
Qty: 90 TABLET | Refills: 2 | Status: SHIPPED | OUTPATIENT
Start: 2024-08-20

## 2024-09-06 ENCOUNTER — ANTI-COAG VISIT (OUTPATIENT)
Age: 59
End: 2024-09-06
Payer: COMMERCIAL

## 2024-09-06 DIAGNOSIS — Z79.01 LONG TERM CURRENT USE OF ANTICOAGULANT THERAPY: ICD-10-CM

## 2024-09-06 DIAGNOSIS — Z95.2 S/P AORTIC VALVE REPLACEMENT: Primary | ICD-10-CM

## 2024-09-06 LAB
POC INR: 3.8
PROTHROMBIN TIME, POC: 45.5

## 2024-09-06 PROCEDURE — 85610 PROTHROMBIN TIME: CPT | Performed by: NURSE PRACTITIONER

## 2024-09-06 RX ORDER — POTASSIUM CHLORIDE 1500 MG/1
30 TABLET, EXTENDED RELEASE ORAL DAILY
Qty: 180 TABLET | Refills: 3 | Status: SHIPPED | OUTPATIENT
Start: 2024-09-06

## 2024-09-06 RX ORDER — FUROSEMIDE 40 MG
60 TABLET ORAL DAILY
Qty: 180 TABLET | Refills: 3 | Status: SHIPPED | OUTPATIENT
Start: 2024-09-06

## 2024-09-06 NOTE — PROGRESS NOTES
The INR is above the therapeutic range.  Ask the patient about bleeding complications.  Please make the following adjustments to Coumadin dosing: Hold today, 1.25mg x 1 then Continue Coumadin to 2.5mg daily except 1.25mg on Thurs.  Repeat the INR in 2 weeks.

## 2024-09-27 ENCOUNTER — ANTI-COAG VISIT (OUTPATIENT)
Age: 59
End: 2024-09-27
Payer: COMMERCIAL

## 2024-09-27 DIAGNOSIS — Z95.2 S/P AORTIC VALVE REPLACEMENT: Primary | ICD-10-CM

## 2024-09-27 DIAGNOSIS — Z79.01 LONG TERM CURRENT USE OF ANTICOAGULANT THERAPY: ICD-10-CM

## 2024-09-27 LAB
POC INR: 3.3
PROTHROMBIN TIME, POC: 39.5

## 2024-09-27 PROCEDURE — 85610 PROTHROMBIN TIME: CPT | Performed by: NURSE PRACTITIONER

## 2024-10-11 ENCOUNTER — ANTI-COAG VISIT (OUTPATIENT)
Age: 59
End: 2024-10-11
Payer: COMMERCIAL

## 2024-10-11 DIAGNOSIS — Z79.01 LONG TERM CURRENT USE OF ANTICOAGULANT THERAPY: ICD-10-CM

## 2024-10-11 DIAGNOSIS — Z95.2 S/P AORTIC VALVE REPLACEMENT: Primary | ICD-10-CM

## 2024-10-11 LAB
POC INR: 4
PROTHROMBIN TIME, POC: 48

## 2024-10-11 PROCEDURE — 85610 PROTHROMBIN TIME: CPT | Performed by: NURSE PRACTITIONER

## 2024-10-11 NOTE — PROGRESS NOTES
The INR is above the therapeutic range.  Ask the patient about bleeding complications.  Please make the following adjustments to Coumadin dosing: Hold today, 1.25mg x 1 then decrease Coumadin to 2.5mg daily except 1.25mg on Mon-Wed-Fri. .  Repeat the INR in 2 weeks.

## 2024-10-14 ENCOUNTER — TRANSCRIBE ORDERS (OUTPATIENT)
Facility: HOSPITAL | Age: 59
End: 2024-10-14

## 2024-10-14 DIAGNOSIS — S37.019A PERINEPHRIC HEMATOMA: Primary | ICD-10-CM

## 2024-10-25 ENCOUNTER — ANTI-COAG VISIT (OUTPATIENT)
Age: 59
End: 2024-10-25
Payer: COMMERCIAL

## 2024-10-25 DIAGNOSIS — Z79.01 LONG TERM CURRENT USE OF ANTICOAGULANT THERAPY: ICD-10-CM

## 2024-10-25 DIAGNOSIS — Z95.2 S/P AORTIC VALVE REPLACEMENT: Primary | ICD-10-CM

## 2024-10-25 LAB
POC INR: 3.1
PROTHROMBIN TIME, POC: 37.2

## 2024-10-25 PROCEDURE — 85610 PROTHROMBIN TIME: CPT | Performed by: NURSE PRACTITIONER

## 2024-10-25 NOTE — PROGRESS NOTES
Leah mom is calling about Kamilla's covid booster which is scheduled at 3:45pm today.  Leah states Kamilla was exposed on Tuesday to covid and she is questioning if Kamilla can still come.    Please advise.  Kamilla does not have any symptoms.  Mom is at [phone].   The INR is above the therapeutic range.  Ask the patient about bleeding complications.  Please make the following adjustments to Coumadin dosing: Hold today then decrease Coumadin to 1.25mg daily except 2.5mg on Sun-Tues-Thurs. .  Repeat the INR in 2 weeks.

## 2024-11-08 ENCOUNTER — ANTI-COAG VISIT (OUTPATIENT)
Age: 59
End: 2024-11-08
Payer: COMMERCIAL

## 2024-11-08 DIAGNOSIS — Z79.01 LONG TERM CURRENT USE OF ANTICOAGULANT THERAPY: ICD-10-CM

## 2024-11-08 DIAGNOSIS — Z95.2 S/P AORTIC VALVE REPLACEMENT: Primary | ICD-10-CM

## 2024-11-08 LAB
POC INR: 3.4
PROTHROMBIN TIME, POC: 41

## 2024-11-08 PROCEDURE — 85610 PROTHROMBIN TIME: CPT | Performed by: NURSE PRACTITIONER

## 2024-11-08 NOTE — PROGRESS NOTES
The INR is above the therapeutic range.  Ask the patient about bleeding complications.  Please make the following adjustments to Coumadin dosing: Hold today then decrease Coumadin to 1.25mg daily except 2.5mg on Sun & Tues..  Repeat the INR in 2 weeks.

## 2024-11-22 ENCOUNTER — ANTI-COAG VISIT (OUTPATIENT)
Age: 59
End: 2024-11-22
Payer: COMMERCIAL

## 2024-11-22 DIAGNOSIS — Z79.01 LONG TERM CURRENT USE OF ANTICOAGULANT THERAPY: ICD-10-CM

## 2024-11-22 DIAGNOSIS — Z95.2 S/P AORTIC VALVE REPLACEMENT: Primary | ICD-10-CM

## 2024-11-22 LAB
POC INR: 1.9
PROTHROMBIN TIME, POC: 22.9

## 2024-11-22 PROCEDURE — 85610 PROTHROMBIN TIME: CPT | Performed by: NURSE PRACTITIONER

## 2024-11-22 NOTE — PROGRESS NOTES
The INR is below the therapeutic range.  Please make the following adjustments to Coumadin dosin.5mg today then Coumadin to 1.25mg daily except 2.5mg on Sun & Tues. .  Repeat the INR in 2 weeks.

## 2024-12-20 ENCOUNTER — TELEPHONE (OUTPATIENT)
Age: 59
End: 2024-12-20

## 2024-12-20 NOTE — TELEPHONE ENCOUNTER
----- Message from Susie Solis PA-C sent at 12/19/2024 12:34 PM EST -----  Regarding: INR  Patient called and gave the  and INR of 1.7 but I am not sure the date of this.  He called to ask if he still needed his INR done tomorrow.  It depends on when this INR was from.  If this was from a week ago, he should come and have it rechecked tomorrow.  Please call and clarify.

## 2025-01-02 RX ORDER — WARFARIN SODIUM 2.5 MG/1
2.5 TABLET ORAL DAILY
Qty: 90 TABLET | Refills: 3 | Status: SHIPPED | OUTPATIENT
Start: 2025-01-02

## 2025-01-22 ENCOUNTER — TELEPHONE (OUTPATIENT)
Age: 60
End: 2025-01-22

## 2025-01-22 NOTE — TELEPHONE ENCOUNTER
----- Message from Susie Solis PA-C sent at 2024 12:34 PM EST -----  Regarding: INR  Patient called and gave the  and INR of 1.7 but I am not sure the date of this.  He called to ask if he still needed his INR done tomorrow.  It depends on when this INR was from.  If this was from a week ago, he should come and have it rechecked tomorrow.  Please call and clarify.      Called and spoke with Scotty Hodge, two pt identifiers verified, name and .    Pt no showed for his INR appointment on 2024.    Asked him why he didn't come to his appointment, he states, I'm on Eliquis now.    Informed him PAWEL Solis will be updated.    Scotty Hodge voiced understanding.

## 2025-01-24 ENCOUNTER — TELEPHONE (OUTPATIENT)
Age: 60
End: 2025-01-24

## 2025-01-24 NOTE — TELEPHONE ENCOUNTER
Scotty Hodge is calling because he received a [email or web address] message from [employer] stating that he is ready to start cardiac rehab and that he will need a note/letter from his cardiologists.  He has an appointment with PAWEL Solis on 1/31/2025.  I am unclear as to who is his cardiologists.  Dr. Jones did see this patient in the hospital on  1/12/25, Susie saw him 12/17.  He has an appointment to see Dr. Luis (cardiologist at [facility]) in February, not unless he is transferring care. Not sure.  In any case can someone give him a call in regards to his cardiac rehab note.          Preferred call back number ; [phone]

## 2025-01-24 NOTE — TELEPHONE ENCOUNTER
Called and spoke with Scotty Hodge, two pt identifiers verified, name and .     Informed him that his cardiac rehab referral will be taken care of at his hospital follow-up appointment on 2025 with PAWEL Solis.    Scotty Hodge voiced understanding.

## 2025-01-31 ENCOUNTER — OFFICE VISIT (OUTPATIENT)
Age: 60
End: 2025-01-31

## 2025-01-31 VITALS
HEIGHT: 71 IN | HEART RATE: 67 BPM | WEIGHT: 278.2 LBS | BODY MASS INDEX: 38.95 KG/M2 | TEMPERATURE: 96.9 F | DIASTOLIC BLOOD PRESSURE: 79 MMHG | SYSTOLIC BLOOD PRESSURE: 132 MMHG | OXYGEN SATURATION: 97 %

## 2025-01-31 DIAGNOSIS — Z95.2 HX OF AORTIC VALVE REPLACEMENT: Primary | ICD-10-CM

## 2025-01-31 DIAGNOSIS — I48.0 PAROXYSMAL ATRIAL FIBRILLATION (HCC): ICD-10-CM

## 2025-01-31 DIAGNOSIS — I48.3 TYPICAL ATRIAL FLUTTER (HCC): ICD-10-CM

## 2025-01-31 DIAGNOSIS — I50.22 CHRONIC SYSTOLIC CHF (CONGESTIVE HEART FAILURE) (HCC): ICD-10-CM

## 2025-01-31 DIAGNOSIS — I35.1 NONRHEUMATIC AORTIC VALVE INSUFFICIENCY: ICD-10-CM

## 2025-01-31 RX ORDER — AMOXICILLIN 250 MG
1 CAPSULE ORAL 2 TIMES DAILY
COMMUNITY
Start: 2025-01-15

## 2025-01-31 RX ORDER — FLUTICASONE PROPIONATE AND SALMETEROL 500; 50 UG/1; UG/1
POWDER RESPIRATORY (INHALATION)
COMMUNITY
Start: 2024-11-06

## 2025-01-31 RX ORDER — DAPAGLIFLOZIN 10 MG/1
10 TABLET, FILM COATED ORAL DAILY
Qty: 90 TABLET | Refills: 3 | Status: SHIPPED | OUTPATIENT
Start: 2025-01-31

## 2025-01-31 RX ORDER — GABAPENTIN 100 MG/1
CAPSULE ORAL
COMMUNITY
Start: 2025-01-16 | End: 2025-02-05

## 2025-01-31 RX ORDER — POLYETHYLENE GLYCOL 3350 17 G/17G
POWDER, FOR SOLUTION ORAL DAILY
COMMUNITY
Start: 2025-01-16

## 2025-01-31 RX ORDER — DAPAGLIFLOZIN 10 MG/1
1 TABLET, FILM COATED ORAL DAILY
COMMUNITY
Start: 2025-01-16 | End: 2025-01-31 | Stop reason: SDUPTHER

## 2025-01-31 RX ORDER — ALLOPURINOL 100 MG/1
TABLET ORAL
COMMUNITY
Start: 2025-01-16

## 2025-01-31 RX ORDER — METOLAZONE 5 MG/1
5 TABLET ORAL EVERY OTHER DAY
Qty: 3 TABLET | Refills: 0 | Status: SHIPPED | OUTPATIENT
Start: 2025-01-31 | End: 2025-02-06

## 2025-01-31 RX ORDER — CEFTRIAXONE 1 G/1
INJECTION, POWDER, FOR SOLUTION INTRAMUSCULAR; INTRAVENOUS EVERY 12 HOURS
COMMUNITY
Start: 2025-01-15 | End: 2025-02-03

## 2025-01-31 RX ORDER — LEVOTHYROXINE SODIUM 88 UG/1
TABLET ORAL
COMMUNITY
Start: 2024-11-06 | End: 2025-01-31 | Stop reason: SDUPTHER

## 2025-01-31 RX ORDER — AMIODARONE HYDROCHLORIDE 400 MG/1
TABLET ORAL
COMMUNITY
Start: 2025-01-16 | End: 2025-01-31 | Stop reason: SDUPTHER

## 2025-01-31 RX ORDER — SODIUM CHLORIDE 0.9 % (FLUSH) 0.9 %
SYRINGE (ML) INJECTION PRN
COMMUNITY
Start: 2024-12-12 | End: 2025-12-11

## 2025-01-31 RX ORDER — LEVOTHYROXINE SODIUM 88 UG/1
88 TABLET ORAL DAILY
Qty: 30 TABLET | Refills: 0 | Status: SHIPPED | OUTPATIENT
Start: 2025-01-31

## 2025-01-31 RX ORDER — AMIODARONE HYDROCHLORIDE 400 MG/1
400 TABLET ORAL DAILY
Qty: 90 TABLET | Refills: 3 | Status: SHIPPED | OUTPATIENT
Start: 2025-01-31

## 2025-01-31 RX ORDER — ISOSORBIDE MONONITRATE 30 MG/1
1 TABLET, EXTENDED RELEASE ORAL DAILY
COMMUNITY
Start: 2025-01-17 | End: 2025-01-31 | Stop reason: SDUPTHER

## 2025-01-31 RX ORDER — AMPICILLIN 1 G/1
INJECTION, POWDER, FOR SOLUTION INTRAMUSCULAR; INTRAVENOUS EVERY 6 HOURS
COMMUNITY
Start: 2025-01-15 | End: 2025-02-03

## 2025-01-31 RX ORDER — ASCORBIC ACID 500 MG
TABLET ORAL DAILY
COMMUNITY
Start: 2025-01-15 | End: 2025-02-14

## 2025-01-31 RX ORDER — ISOSORBIDE MONONITRATE 30 MG/1
30 TABLET, EXTENDED RELEASE ORAL DAILY
Qty: 90 TABLET | Refills: 3 | Status: SHIPPED | OUTPATIENT
Start: 2025-01-31 | End: 2026-01-26

## 2025-01-31 RX ORDER — ASCORBIC ACID, THIAMINE MONONITRATE,RIBOFLAVIN, NIACINAMIDE, PYRIDOXINE HYDROCHLORIDE, FOLIC ACID, CYANOCOBALAMIN, BIOTIN, CALCIUM PANTOTHENATE, 100; 1.5; 1.7; 20; 10; 1; 6000; 150000; 5 MG/1; MG/1; MG/1; MG/1; MG/1; MG/1; UG/1; UG/1; MG/1
1 CAPSULE, LIQUID FILLED ORAL DAILY
COMMUNITY
Start: 2025-01-16

## 2025-01-31 RX ORDER — HYDRALAZINE HYDROCHLORIDE 100 MG/1
TABLET, FILM COATED ORAL EVERY 8 HOURS
COMMUNITY
Start: 2025-01-16 | End: 2025-01-31 | Stop reason: SDUPTHER

## 2025-01-31 RX ORDER — HYDRALAZINE HYDROCHLORIDE 100 MG/1
100 TABLET, FILM COATED ORAL EVERY 8 HOURS
Qty: 260 TABLET | Refills: 3 | Status: SHIPPED | OUTPATIENT
Start: 2025-01-31 | End: 2026-01-13

## 2025-01-31 ASSESSMENT — ENCOUNTER SYMPTOMS
SHORTNESS OF BREATH: 0
DIARRHEA: 0
NAUSEA: 0
SORE THROAT: 0
COUGH: 0
VOMITING: 0
WHEEZING: 0
ABDOMINAL PAIN: 0
RHINORRHEA: 0

## 2025-01-31 ASSESSMENT — PATIENT HEALTH QUESTIONNAIRE - PHQ9
SUM OF ALL RESPONSES TO PHQ QUESTIONS 1-9: 0
SUM OF ALL RESPONSES TO PHQ9 QUESTIONS 1 & 2: 0
SUM OF ALL RESPONSES TO PHQ QUESTIONS 1-9: 0
SUM OF ALL RESPONSES TO PHQ QUESTIONS 1-9: 0
2. FEELING DOWN, DEPRESSED OR HOPELESS: NOT AT ALL
SUM OF ALL RESPONSES TO PHQ QUESTIONS 1-9: 0
1. LITTLE INTEREST OR PLEASURE IN DOING THINGS: NOT AT ALL

## 2025-01-31 NOTE — PROGRESS NOTES
1. \"Have you been to the ER, urgent care clinic since your last visit?  Hospitalized since your last visit?\" Reviewed by PAWEL Zimmerman    2. \"Have you seen or consulted any other health care providers outside of the [facility] since your last visit?\" Reviewed by  PAWEL Zimmerman

## 2025-01-31 NOTE — PROGRESS NOTES
Scotty Hodge (:  1965) is a 59 y.o. male, with history significant for AI s/p mechanical valve with later explant and redo with epic supra bioprosthetic valve 2025, persistent afib/flutter, HTN, HLD, CKD, CAD s/p CABG in  and PCI to RCA x 3 and RPDA x 1 in , chronic systolic CHF, COPD, LINCOLN, hemorrhagic shock secondary to right kidney extravasation s/p embolization 2024, hypothyroidism, BPH, gout, cirrhosis, hypothyroidism recent admission for enterococcus faecalis bacteremia who presents for hospital follow up.     Scotty was admitted to [facility] from 2025 through 2025 for planned redo aortic valve replacement.  His On-X mechanical aortic valve was explanted and he received a epic supra bioprosthetic valve.  He also underwent right sided atrial flutter ablation, closure of patent boswell ovale, left atrial appendage ligation same day on 2025.  He was initially in normal sinus rhythm post ablation but later went into A-fib with RVR postoperatively.  His anticoagulation was changed from warfarin to Eliquis.  Course further complicated by BECKA on CKD.  History of Present Illness  He has been evaluated by the structural team, who have deemed him fit to commence rehabilitation, pending clearance from this office. He reports a reduction in swelling, although it persists to some extent. He also notes dryness and scaliness of the skin, which he has been managing with lotion application. He has not yet consulted with his nephrologist, with an appointment scheduled for either March or 2025. His weight has been gradually decreasing since his hospital discharge, from 278 pounds to 272 pounds, as per his home scale readings. However, his scale malfunctioned two days ago, consistently displaying a weight of 19 pounds. He has been experiencing increased urinary frequency. He has been on a regimen of torsemide 80 mg daily, initiated approximately one week ago.

## 2025-02-07 ENCOUNTER — TELEPHONE (OUTPATIENT)
Age: 60
End: 2025-02-07

## 2025-02-07 NOTE — TELEPHONE ENCOUNTER
Pt states\"I am out of medication and the pharmacy is telling me I don't have a prescription there or refills, that I need to call the doctors office to get authorization.\"

## 2025-02-08 ENCOUNTER — TELEPHONE (OUTPATIENT)
Age: 60
End: 2025-02-08

## 2025-02-08 DIAGNOSIS — I50.9 CONGESTIVE HEART FAILURE, UNSPECIFIED HF CHRONICITY, UNSPECIFIED HEART FAILURE TYPE (HCC): Primary | ICD-10-CM

## 2025-02-08 DIAGNOSIS — I50.9 CONGESTIVE HEART FAILURE, UNSPECIFIED HF CHRONICITY, UNSPECIFIED HEART FAILURE TYPE (HCC): ICD-10-CM

## 2025-02-08 NOTE — TELEPHONE ENCOUNTER
Patient called the answering service over the weekend stating that his torsemide prescription was not received by his pharmacy, though it was clearly sent in the computer.  There was an alert of a of prior authorization required which is quite confusing for a standard loop diuretic.  Patient apparently did not tolerate the other loop diuretics during his recent hospital stay and was discharged home on torsemide.  I have attempted to resend the torsemide 40 mg prescription to the pharmacy on the weekend.  Hopefully this will be approved.  This is above my pay grade.  This is a prime example of why the general public is frustrated with insurance companies.

## 2025-02-10 DIAGNOSIS — I50.9 CONGESTIVE HEART FAILURE, UNSPECIFIED HF CHRONICITY, UNSPECIFIED HEART FAILURE TYPE (HCC): ICD-10-CM

## 2025-02-10 RX ORDER — FUROSEMIDE 80 MG/1
80 TABLET ORAL DAILY
Qty: 90 TABLET | Refills: 3 | OUTPATIENT
Start: 2025-02-10

## 2025-02-10 RX ORDER — FUROSEMIDE 80 MG/1
TABLET ORAL
Refills: 0 | OUTPATIENT
Start: 2025-02-10

## 2025-02-24 ENCOUNTER — HOSPITAL ENCOUNTER (OUTPATIENT)
Facility: HOSPITAL | Age: 60
Discharge: HOME | End: 2025-02-27
Payer: COMMERCIAL

## 2025-02-24 DIAGNOSIS — I50.22 CHRONIC SYSTOLIC CHF (CONGESTIVE HEART FAILURE) (HCC): ICD-10-CM

## 2025-02-24 LAB
ANION GAP SERPL CALC-SCNC: 9 MMOL/L (ref 3–18)
BUN SERPL-MCNC: 48 MG/DL (ref 7–18)
BUN/CREAT SERPL: 23 (ref 12–20)
CALCIUM SERPL-MCNC: 9.4 MG/DL (ref 8.5–10.1)
CHLORIDE SERPL-SCNC: 101 MMOL/L (ref 100–111)
CO2 SERPL-SCNC: 28 MMOL/L (ref 21–32)
CREAT SERPL-MCNC: 2.08 MG/DL (ref 0.6–1.3)
GLUCOSE SERPL-MCNC: 128 MG/DL (ref 74–99)
POTASSIUM SERPL-SCNC: 3.3 MMOL/L (ref 3.5–5.5)
SODIUM SERPL-SCNC: 138 MMOL/L (ref 136–145)

## 2025-02-24 PROCEDURE — 36415 COLL VENOUS BLD VENIPUNCTURE: CPT

## 2025-02-24 PROCEDURE — 80048 BASIC METABOLIC PNL TOTAL CA: CPT

## 2025-02-26 ENCOUNTER — PATIENT MESSAGE (OUTPATIENT)
Age: 60
End: 2025-02-26

## 2025-02-26 DIAGNOSIS — I50.22 CHRONIC SYSTOLIC CHF (CONGESTIVE HEART FAILURE) (HCC): ICD-10-CM

## 2025-02-26 DIAGNOSIS — Z95.2 HX OF AORTIC VALVE REPLACEMENT: ICD-10-CM

## 2025-02-26 DIAGNOSIS — I48.0 PAROXYSMAL ATRIAL FIBRILLATION (HCC): ICD-10-CM

## 2025-02-26 RX ORDER — POTASSIUM CHLORIDE 1500 MG/1
20 TABLET, EXTENDED RELEASE ORAL DAILY
Qty: 30 TABLET | Refills: 1 | Status: SHIPPED | OUTPATIENT
Start: 2025-02-26

## 2025-02-28 ENCOUNTER — OFFICE VISIT (OUTPATIENT)
Age: 60
End: 2025-02-28
Payer: COMMERCIAL

## 2025-02-28 VITALS
HEART RATE: 64 BPM | BODY MASS INDEX: 38.56 KG/M2 | SYSTOLIC BLOOD PRESSURE: 132 MMHG | WEIGHT: 275.4 LBS | RESPIRATION RATE: 16 BRPM | OXYGEN SATURATION: 96 % | DIASTOLIC BLOOD PRESSURE: 77 MMHG | HEIGHT: 71 IN | TEMPERATURE: 98.2 F

## 2025-02-28 DIAGNOSIS — I10 PRIMARY HYPERTENSION: ICD-10-CM

## 2025-02-28 DIAGNOSIS — N18.4 STAGE 4 CHRONIC KIDNEY DISEASE (HCC): ICD-10-CM

## 2025-02-28 DIAGNOSIS — I48.0 PAROXYSMAL A-FIB (HCC): ICD-10-CM

## 2025-02-28 DIAGNOSIS — I50.22 CHRONIC SYSTOLIC CHF (CONGESTIVE HEART FAILURE) (HCC): Primary | ICD-10-CM

## 2025-02-28 DIAGNOSIS — Z95.2 HX OF AORTIC VALVE REPLACEMENT: ICD-10-CM

## 2025-02-28 DIAGNOSIS — M79.601 MUSCULOSKELETAL PAIN OF RIGHT UPPER EXTREMITY: ICD-10-CM

## 2025-02-28 DIAGNOSIS — I48.3 TYPICAL ATRIAL FLUTTER (HCC): ICD-10-CM

## 2025-02-28 DIAGNOSIS — E87.6 HYPOKALEMIA: ICD-10-CM

## 2025-02-28 PROCEDURE — 99215 OFFICE O/P EST HI 40 MIN: CPT

## 2025-02-28 PROCEDURE — 3078F DIAST BP <80 MM HG: CPT

## 2025-02-28 PROCEDURE — 3075F SYST BP GE 130 - 139MM HG: CPT

## 2025-02-28 ASSESSMENT — ENCOUNTER SYMPTOMS
WHEEZING: 0
NAUSEA: 0
DIARRHEA: 0
RHINORRHEA: 0
ABDOMINAL PAIN: 0
VOMITING: 0
COUGH: 0
SORE THROAT: 0
SHORTNESS OF BREATH: 0

## 2025-02-28 ASSESSMENT — LIFESTYLE VARIABLES: HOW MANY STANDARD DRINKS CONTAINING ALCOHOL DO YOU HAVE ON A TYPICAL DAY: PATIENT DOES NOT DRINK

## 2025-02-28 ASSESSMENT — PATIENT HEALTH QUESTIONNAIRE - PHQ9
SUM OF ALL RESPONSES TO PHQ QUESTIONS 1-9: 0
2. FEELING DOWN, DEPRESSED OR HOPELESS: NOT AT ALL
SUM OF ALL RESPONSES TO PHQ9 QUESTIONS 1 & 2: 0
SUM OF ALL RESPONSES TO PHQ QUESTIONS 1-9: 0
1. LITTLE INTEREST OR PLEASURE IN DOING THINGS: NOT AT ALL

## 2025-02-28 NOTE — PATIENT INSTRUCTIONS
Medication Changes:  Increase torsemide to 80mg daily for 7 days then return to 40mg daily   Potassium 20meq daily     Other recommendations:  -Low salt diet - less than 2g daily   -Fluid restriction - 1.5L daily   -BMP in 1 week    Patient Education        DASH Diet: Care Instructions  Your Care Instructions     The DASH diet is an eating plan that can help lower your blood pressure. DASH stands for Dietary Approaches to Stop Hypertension. Hypertension is high blood pressure.  The DASH diet focuses on eating foods that are high in calcium, potassium, and magnesium. These nutrients can lower blood pressure. The foods that are highest in these nutrients are fruits, vegetables, low-fat dairy products, nuts, seeds, and legumes. But taking calcium, potassium, and magnesium supplements instead of eating foods that are high in those nutrients does not have the same effect. The DASH diet also includes whole grains, fish, and poultry.  The DASH diet is one of several lifestyle changes your doctor may recommend to lower your high blood pressure. Your doctor may also want you to decrease the amount of sodium in your diet. Lowering sodium while following the DASH diet can lower blood pressure even further than just the DASH diet alone.  Follow-up care is a key part of your treatment and safety. Be sure to make and go to all appointments, and call your doctor if you are having problems. It's also a good idea to know your test results and keep a list of the medicines you take.  How can you care for yourself at home?  Following the DASH diet  Eat 4 to 5 servings of fruit each day. A serving is 1 medium-sized piece of fruit, 1/2 cup raw or canned fruit, 1/4 cup dried fruit, or 4 ounces (1/2 cup) of fruit juice. Choose fruit more often than fruit juice.  Eat 4 to 5 servings of vegetables each day. A serving is 1 cup of lettuce or raw leafy vegetables, 1/2 cup of chopped or cooked vegetables, or 4 ounces (1/2 cup) of vegetable

## 2025-02-28 NOTE — PROGRESS NOTES
Scotty Hodge (:  1965) is a 59 y.o. male, with history significant for  AI s/p mechanical valve with later explant and redo with epic supra bioprosthetic valve 2025, paroxysmal afib/flutter s/p ablation 25, HTN, HLD, CKD, CAD s/p CABG in  and PCI to RCA x 3 and RPDA x 1 in , chronic systolic CHF, COPD, [place], hemorrhagic shock secondary to right kidney extravasation s/p embolization 2024, hypothyroidism, BPH, gout, cirrhosis, hypothyroidism recent admission for enterococcus faecalis bacteremia who presents for 3-week follow-up of CHF.    Scotty reports that he has overall been feeling well since his last visit.  He states that his swelling did improve some but he still has significant lower extremity edema.  He noticed that when he decreased his torsemide back to 40 mg, his swelling has been stable but he has not had any further improvement.  He does not have any issues with shortness of breath or chest discomfort.  He reports a general sense of malaise or fogginess along with occasional headaches which are overall mild in intensity.  He initially just attributed these symptoms to needing more time to recover from surgery.  However, he has not really noticed any improvements in his symptoms more recently.  He reports that he is having some changes in his taste which she was told could occur with amoxicillin but he has since completed this medication yet this persists.  He is looking to go back to work.  He does not do any lifting.  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  He reports he has a doctor and a physician assistant nearby at all times and would be well cared for if something were to occur.  He denies palpitations, abdominal pain, nausea, vomiting, fever/chills, PND, dizziness or lightheadedness, presyncope or syncope.    Relevant results  Echo 1/15/2025 - CONCLUSIONS    * Ultrasound enhancing agent is used.    * Left ventricular chamber size is moderately

## 2025-02-28 NOTE — PROGRESS NOTES
1. \"Have you been to the ER, urgent care clinic since your last visit?  Hospitalized since your last visit?\" Reviewed by Dr. Mehul Jones     2. \"Have you seen or consulted any other health care providers outside of the [facility] since your last visit?\" Reviewed by Dr. Mehul Jones

## 2025-03-03 ENCOUNTER — TELEPHONE (OUTPATIENT)
Age: 60
End: 2025-03-03

## 2025-03-03 NOTE — TELEPHONE ENCOUNTER
Incoming call from ALLYSON Barney ([facility] ), two pt identifiers verified, name and  for Scotty Hodge.     She asked if we could fax the patient chronic condition form.    Informed her that the patient didn't bring a form to his appointment for the provider to sign, nor is there a form in his chart.    Asked that the form be faxed to [phone].    She states, \"that she will contact the patient and have him give the fax number to whomever is requesting the form.     For additional questions or concerns, my direct line is [phone] extension [phone].    Informed her we will wait for the form, once its received it will be signed and sent.    ALLYSON Barney voiced understanding.   
- - -

## 2025-03-04 NOTE — TELEPHONE ENCOUNTER
PCP: Eladio Marshall MD    Last appt:  2/28/2025   Future Appointments   Date Time Provider Department Center   6/24/2025  3:00 PM Susie Solis PA-C [facility]   10/16/2025  3:00 PM Mehul Jones Sr., MD [facility]       Requested Prescriptions     Pending Prescriptions Disp Refills    levothyroxine (SYNTHROID) 88 MCG tablet [Pharmacy Med Name: LEVOTHYROXINE 88 MCG TABLET] 30 tablet 0     Sig: TAKE 1 TABLET BY MOUTH EVERY DAY       Request for a 30 or 90 day supply? Provider Discretion    Pharmacy: Confirmed     Other Comments: N/A

## 2025-03-07 RX ORDER — TORSEMIDE 20 MG/1
40 TABLET ORAL DAILY
Qty: 190 TABLET | Refills: 3 | Status: SHIPPED | OUTPATIENT
Start: 2025-03-07 | End: 2026-03-22

## 2025-03-11 RX ORDER — LEVOTHYROXINE SODIUM 88 UG/1
88 TABLET ORAL DAILY
Qty: 90 TABLET | Refills: 3 | Status: SHIPPED | OUTPATIENT
Start: 2025-03-11

## 2025-04-22 NOTE — RESULT ENCOUNTER NOTE
Reviewed CT with Dr. Sorensen, he believes the hematoma is starting to break down, but is taking awhile to resolve. He recommends repeat CT A/P w and w/o contrast in 3 months.     Previously it was a question on whether he can return to work, per Dr. Sorensen, as long as he is not having pain he is cleared from a urology standpoint. 
No

## 2025-04-30 ENCOUNTER — HOSPITAL ENCOUNTER (OUTPATIENT)
Facility: HOSPITAL | Age: 60
Setting detail: RECURRING SERIES
Discharge: HOME | End: 2025-05-03
Payer: COMMERCIAL

## 2025-04-30 PROCEDURE — 97535 SELF CARE MNGMENT TRAINING: CPT

## 2025-04-30 PROCEDURE — 97110 THERAPEUTIC EXERCISES: CPT

## 2025-04-30 PROCEDURE — 97162 PT EVAL MOD COMPLEX 30 MIN: CPT

## 2025-04-30 NOTE — PROGRESS NOTES
PHYSICAL / OCCUPATIONAL THERAPY - DAILY TREATMENT NOTE     Patient Name: Scotty Hodge    Date: 2025    : 1965  Insurance: Payor: MARTHA LOUISEBS / [place] [employer] / Product Type: *No Product type* /      Patient  verified Yes     Visit #   Current / Total 1 24   Time   In / Out 4:30 5:04   Pain   In / Out 2/10 0/10   Subjective Functional Status/Changes: See IE   Changes to:  Allergies, Med Hx, Sx Hx?   no       TREATMENT AREA =  Pain in right arm [M79.601]  Right shoulder pain [M25.511]    If an interpreting service is utilized for treatment of this patient, the contents of this document represent the material reviewed with the patient via the .     OBJECTIVE  Therapeutic Procedures:  Tx Min Billable or 1:1 Min (if diff from Tx Min) Procedure, Rationale, Specifics   15  82802 Therapeutic Exercise (timed):  increase ROM, strength, coordination, balance, and proprioception to improve patient's ability to progress to PLOF and address remaining functional goals. (see flow sheet as applicable)    Details if applicable:       8  04370 Self Care/Home Management (timed):  improve patient knowledge and understanding of related anatomy and it's relationship to biomechanics of right shoulder.  to improve patient's ability to progress to PLOF and address remaining functional goals.  (see flow sheet as applicable)    Details if applicable:  discussion and education regarding positionally challenging strength in supine, to reclined position, mid and lower traps role in scapular retraction/depression.   23  [facility] Totals Reminder: bill using total billable min of TIMED therapeutic procedures (example: do not include dry needle or estim unattended, both untimed codes, in totals to left)  8-22 min = 1 unit; 23-37 min = 2 units; 38-52 min = 3 units; 53-67 min = 4 units; 68-82 min = 5 units   Total Total     TOTAL TREATMENT TIME:        34     Charge Capture    [x]  Patient Education billed concurrently with 
participation in recreation  ;Presentation:  MEDIUM Complexity : Evolving with changing characteristics  ;Clinical Decision Making:  QuickDASH: Disability Arm, Shoulder, Hand = 43 % ; (41%- 60% Moderate Disability) = MODERATE Complexity FOTO score = an established functional score where 100 = no disability  Overall Complexity Rating: MEDIUM    Problem List: pain affecting function, decrease ROM, decrease strength, decrease ADL/functional abilities, decrease activity tolerance, and decrease flexibility/joint mobility   Treatment Plan may include any combination of the followin Therapeutic Exercise, 14045 Neuromuscular Re-Education, 84830 Manual Therapy, 68410 Therapeutic Activity, 75716 Self Care/Home Management, and 46136 Ultrasound  Patient / Family readiness to learn indicated by: asking questions, trying to perform skills, interest, return verbalization , and return demonstration   Persons(s) to be included in education: patient (P)  Barriers to Learning/Limitations: none  Measures taken if barriers to learning present: None  Patient Goal (s): \"Increase ROM, decrease pain\"  Patient Self Reported Health Status: fair  Rehabilitation Potential: good    Short Term Goals: To be accomplished in 2 weeks  The patient will demonstrate independence and compliance with HEP to maximize therapeutic benefit.  The patient will improve AROM of right shoulder to 125 degrees to improve ease of reaching overhead.  Long Term Goals: To be accomplished in 12 weeks  The patient will improve Quick DASH score to < 15% disability in order to improve ease of ADLs.  The patient will attain AMANDA to C7 right shoulder to improve ease of performing grooming.  The patient will improve right shoulder flexion/ABD strength to 4+/5 MMT to maximize ease of ADLs.  The patient will attain AROM of right shoulder flexion and ABD to 150 degrees to improve ease of chore production.    Frequency / Duration: Patient to be seen 2 times per week for 24

## 2025-05-01 ENCOUNTER — HOSPITAL ENCOUNTER (OUTPATIENT)
Facility: HOSPITAL | Age: 60
Setting detail: RECURRING SERIES
Discharge: HOME | End: 2025-05-04
Payer: COMMERCIAL

## 2025-05-01 PROCEDURE — 97530 THERAPEUTIC ACTIVITIES: CPT

## 2025-05-01 PROCEDURE — 97016 VASOPNEUMATIC DEVICE THERAPY: CPT

## 2025-05-01 PROCEDURE — 97110 THERAPEUTIC EXERCISES: CPT

## 2025-05-01 PROCEDURE — 97112 NEUROMUSCULAR REEDUCATION: CPT

## 2025-05-01 NOTE — PROGRESS NOTES
PHYSICAL / OCCUPATIONAL THERAPY - DAILY TREATMENT NOTE     Patient Name: Scotty Hodge    Date: 2025    : 1965  Insurance: [place] / [place] / Product Type: *No Product type* /      Patient  verified Yes     Visit #   Current / Total 2 24   Time   In / Out 10:28 11:24   Pain   In / Out 0 0   Subjective Functional Status/Changes: No new changes   Changes to:  Allergies, Med Hx, Sx Hx?   no       TREATMENT AREA =  Pain in right arm [M79.601]  Right shoulder pain [M25.511]    If an interpreting service is utilized for treatment of this patient, the contents of this document represent the material reviewed with the patient via the .     OBJECTIVE    Modalities Rationale:     decrease edema, decrease pain, and increase tissue extensibility to improve patient's ability to progress to PLOF and address remaining functional goals.     min [] Estim Unattended, type/location:                                      []  w/ice    []  w/heat    min [] Estim Attended, type/location:                                     []  w/US     []  w/ice    []  w/heat    []  TENS insruct      min []  Mechanical Traction: type/lbs                   []  pro   []  sup   []  int   []  cont    []  before manual    []  after manual    min []  Ultrasound, settings/location:      min []  Iontophoresis w/ dexamethasone, location:                                               []  take home patch       []  in clinic        min  unbilled []  Ice     []  Heat    location/position:     min []  Paraffin,  details:    10 min [x]  Vasopneumatic Device, press/temp: LP/LT    min []  Whirlpool / Fluido:    If using vaso (only need to measure limb vaso being performed on)      pre-treatment girth : 36.5 cms      post-treatment girth : 36.5 cms      measured at (landmark location) :  R mid-biceps    min []  Other:    Skin assessment post-treatment (if applicable):    []  intact    []  redness- no adverse reaction               
chest pain

## 2025-05-12 ENCOUNTER — HOSPITAL ENCOUNTER (OUTPATIENT)
Facility: HOSPITAL | Age: 60
Setting detail: RECURRING SERIES
Discharge: HOME | End: 2025-05-15
Payer: COMMERCIAL

## 2025-05-12 PROCEDURE — 97530 THERAPEUTIC ACTIVITIES: CPT

## 2025-05-12 PROCEDURE — 97112 NEUROMUSCULAR REEDUCATION: CPT

## 2025-05-12 PROCEDURE — 97110 THERAPEUTIC EXERCISES: CPT

## 2025-05-12 NOTE — PROGRESS NOTES
estim unattended, both untimed codes, in totals to left)  8-22 min = 1 unit; 23-37 min = 2 units; 38-52 min = 3 units; 53-67 min = 4 units; 68-82 min = 5 units   Total Total     TOTAL TREATMENT TIME:        38     Charge Capture    [x]  Patient Education billed concurrently with other procedures   [x] Review HEP    [] Progressed/Changed HEP, detail:    [] Other detail:       Objective Information/Functional Measures/Assessment    Patient tolerated treatment session well today. Added bent over row/ext. Initiated I's and T's on inclined bench this session. Increased reps of side lying GHJ exercises. Right shoulder AROM flexion is 120 degrees. Patient continues to make steady progress toward goals and would benefit from continued skilled PT intervention to address remaining deficits outlined in goals below.'    Patient will continue to benefit from skilled PT / OT services to modify and progress therapeutic interventions, analyze and address functional mobility deficits, analyze and address ROM deficits, analyze and address strength deficits, analyze and address soft tissue restrictions, analyze and cue for proper movement patterns, analyze and modify for postural abnormalities, and instruct in home and community integration to address functional deficits and attain remaining goals.    Progress toward goals / Updated goals:  []  See Progress Note/Recertification    Short Term Goals: To be accomplished in 2 weeks  The patient will demonstrate independence and compliance with HEP to maximize therapeutic benefit.              IE: issued HEP  Current: Partial compliance per pt's report, 05/01/2025  The patient will improve AROM of right shoulder to 125 degrees to improve ease of reaching overhead.              IE: 87 degrees   Current: 120 degrees 5/12/2025.  Long Term Goals: To be accomplished in 12 weeks  The patient will improve Quick DASH score to < 15% disability in order to improve ease of ADLs.              IE:

## 2025-05-14 ENCOUNTER — APPOINTMENT (OUTPATIENT)
Facility: HOSPITAL | Age: 60
End: 2025-05-14
Payer: COMMERCIAL

## 2025-05-14 ENCOUNTER — TELEPHONE (OUTPATIENT)
Facility: HOSPITAL | Age: 60
End: 2025-05-14

## 2025-05-14 NOTE — TELEPHONE ENCOUNTER
patient stuck with emergency at work, will not make it back on this side in time for appt, called to cxl

## 2025-05-19 ENCOUNTER — APPOINTMENT (OUTPATIENT)
Facility: HOSPITAL | Age: 60
End: 2025-05-19
Payer: COMMERCIAL

## 2025-05-21 ENCOUNTER — APPOINTMENT (OUTPATIENT)
Facility: HOSPITAL | Age: 60
End: 2025-05-21
Payer: COMMERCIAL

## 2025-05-22 NOTE — TELEPHONE ENCOUNTER
PCP: Eladio Marshall MD    Last appt:  2/28/2025   Future Appointments   Date Time Provider Department Center   5/27/2025  5:50 PM SaleemCindy pradochary, PTA [facility]   5/29/2025  4:30 PM SaleemVarinder castro, PTA [facility]   6/2/2025  4:30 PM Varinder Monge, PTA [facility]   6/4/2025  4:30 PM Ozzy Muñoz, PTA [facility]   6/9/2025  5:10 PM Warner Blackmon, PT [facility]   6/11/2025  4:30 PM Cody Valladares, PTA [facility]   6/16/2025  5:10 PM Warner Blackmon, PT [facility]   6/18/2025  5:10 PM Warner Blackmon, PT [facility]   6/23/2025  5:10 PM Warner Blackmon, PT [facility]   6/24/2025  3:00 PM Susie Solis PA-C [facility]   6/25/2025  5:10 PM Warner Blackmon, PT [facility]   10/16/2025  3:00 PM Mehul Jones Sr., MD [facility]       Requested Prescriptions     Pending Prescriptions Disp Refills    potassium chloride (KLOR-CON M) 20 MEQ extended release tablet [Pharmacy Med Name: POTASSIUM CL ER 20 MEQ TAB MCR] 90 tablet 3     Sig: TAKE 1 TABLET BY MOUTH EVERY DAY       Request for a 30 or 90 day supply? Provider Discretion    Pharmacy: CONFIRMED    Other Comments: N/A

## 2025-05-27 ENCOUNTER — TELEPHONE (OUTPATIENT)
Facility: HOSPITAL | Age: 60
End: 2025-05-27

## 2025-05-27 ENCOUNTER — APPOINTMENT (OUTPATIENT)
Facility: HOSPITAL | Age: 60
End: 2025-05-27
Payer: COMMERCIAL

## 2025-05-27 RX ORDER — POTASSIUM CHLORIDE 1500 MG/1
20 TABLET, EXTENDED RELEASE ORAL DAILY
Qty: 90 TABLET | Refills: 3 | Status: SHIPPED | OUTPATIENT
Start: 2025-05-27

## 2025-05-27 NOTE — TELEPHONE ENCOUNTER
Pt called stating he has an ortho appt and will need to f/u with re continuation of PT. He stated he will CB re next week's appt.

## 2025-05-29 ENCOUNTER — APPOINTMENT (OUTPATIENT)
Facility: HOSPITAL | Age: 60
End: 2025-05-29
Payer: COMMERCIAL

## 2025-06-02 ENCOUNTER — TELEPHONE (OUTPATIENT)
Facility: HOSPITAL | Age: 60
End: 2025-06-02

## 2025-06-02 NOTE — TELEPHONE ENCOUNTER
patient would like to be discharged until he gets cleared by ortho he was made aware that a new referal will have to be sent if he was to come back to PT

## 2025-06-24 ENCOUNTER — OFFICE VISIT (OUTPATIENT)
Age: 60
End: 2025-06-24
Payer: COMMERCIAL

## 2025-06-24 ENCOUNTER — HOSPITAL ENCOUNTER (OUTPATIENT)
Facility: HOSPITAL | Age: 60
Setting detail: SPECIMEN
Discharge: HOME | End: 2025-06-27
Payer: COMMERCIAL

## 2025-06-24 VITALS
SYSTOLIC BLOOD PRESSURE: 130 MMHG | HEIGHT: 71 IN | WEIGHT: 278.8 LBS | BODY MASS INDEX: 39.03 KG/M2 | OXYGEN SATURATION: 95 % | DIASTOLIC BLOOD PRESSURE: 70 MMHG | HEART RATE: 61 BPM

## 2025-06-24 DIAGNOSIS — G47.33 OSA (OBSTRUCTIVE SLEEP APNEA): ICD-10-CM

## 2025-06-24 DIAGNOSIS — Z95.1 HX OF CABG: ICD-10-CM

## 2025-06-24 DIAGNOSIS — I50.22 CHRONIC SYSTOLIC CHF (CONGESTIVE HEART FAILURE) (HCC): ICD-10-CM

## 2025-06-24 DIAGNOSIS — E87.6 HYPOKALEMIA: ICD-10-CM

## 2025-06-24 DIAGNOSIS — I48.0 PAROXYSMAL ATRIAL FIBRILLATION (HCC): Primary | ICD-10-CM

## 2025-06-24 DIAGNOSIS — I10 PRIMARY HYPERTENSION: ICD-10-CM

## 2025-06-24 DIAGNOSIS — I35.9 AORTIC VALVE DISEASE: ICD-10-CM

## 2025-06-24 DIAGNOSIS — I25.10 ATHEROSCLEROSIS OF NATIVE CORONARY ARTERY OF NATIVE HEART WITHOUT ANGINA PECTORIS: ICD-10-CM

## 2025-06-24 DIAGNOSIS — I48.3 TYPICAL ATRIAL FLUTTER (HCC): ICD-10-CM

## 2025-06-24 LAB
ANION GAP SERPL CALC-SCNC: 15 MMOL/L (ref 3–18)
BUN SERPL-MCNC: 34 MG/DL (ref 6–23)
BUN/CREAT SERPL: 17 (ref 12–20)
CALCIUM SERPL-MCNC: 9.2 MG/DL (ref 8.5–10.1)
CHLORIDE SERPL-SCNC: 104 MMOL/L (ref 98–107)
CO2 SERPL-SCNC: 23 MMOL/L (ref 21–32)
CREAT SERPL-MCNC: 2.07 MG/DL (ref 0.6–1.3)
GLUCOSE SERPL-MCNC: 102 MG/DL (ref 74–108)
POTASSIUM SERPL-SCNC: 3.4 MMOL/L (ref 3.5–5.5)
SODIUM SERPL-SCNC: 142 MMOL/L (ref 136–145)

## 2025-06-24 PROCEDURE — 3078F DIAST BP <80 MM HG: CPT

## 2025-06-24 PROCEDURE — 93000 ELECTROCARDIOGRAM COMPLETE: CPT

## 2025-06-24 PROCEDURE — 99214 OFFICE O/P EST MOD 30 MIN: CPT

## 2025-06-24 PROCEDURE — 36415 COLL VENOUS BLD VENIPUNCTURE: CPT

## 2025-06-24 PROCEDURE — 80048 BASIC METABOLIC PNL TOTAL CA: CPT

## 2025-06-24 PROCEDURE — 3075F SYST BP GE 130 - 139MM HG: CPT

## 2025-06-24 RX ORDER — AMIODARONE HYDROCHLORIDE 200 MG/1
200 TABLET ORAL DAILY
Qty: 90 TABLET | Refills: 3 | Status: SHIPPED | OUTPATIENT
Start: 2025-06-24

## 2025-06-24 ASSESSMENT — PATIENT HEALTH QUESTIONNAIRE - PHQ9
6. FEELING BAD ABOUT YOURSELF - OR THAT YOU ARE A FAILURE OR HAVE LET YOURSELF OR YOUR FAMILY DOWN: NEARLY EVERY DAY
4. FEELING TIRED OR HAVING LITTLE ENERGY: NEARLY EVERY DAY
SUM OF ALL RESPONSES TO PHQ QUESTIONS 1-9: 15
9. THOUGHTS THAT YOU WOULD BE BETTER OFF DEAD, OR OF HURTING YOURSELF: SEVERAL DAYS
7. TROUBLE CONCENTRATING ON THINGS, SUCH AS READING THE NEWSPAPER OR WATCHING TELEVISION: NOT AT ALL
SUM OF ALL RESPONSES TO PHQ QUESTIONS 1-9: 14
SUM OF ALL RESPONSES TO PHQ QUESTIONS 1-9: 15
8. MOVING OR SPEAKING SO SLOWLY THAT OTHER PEOPLE COULD HAVE NOTICED. OR THE OPPOSITE, BEING SO FIGETY OR RESTLESS THAT YOU HAVE BEEN MOVING AROUND A LOT MORE THAN USUAL: NOT AT ALL
10. IF YOU CHECKED OFF ANY PROBLEMS, HOW DIFFICULT HAVE THESE PROBLEMS MADE IT FOR YOU TO DO YOUR WORK, TAKE CARE OF THINGS AT HOME, OR GET ALONG WITH OTHER PEOPLE: SOMEWHAT DIFFICULT
1. LITTLE INTEREST OR PLEASURE IN DOING THINGS: NEARLY EVERY DAY
3. TROUBLE FALLING OR STAYING ASLEEP: NOT AT ALL
SUM OF ALL RESPONSES TO PHQ QUESTIONS 1-9: 15
2. FEELING DOWN, DEPRESSED OR HOPELESS: NEARLY EVERY DAY
5. POOR APPETITE OR OVEREATING: MORE THAN HALF THE DAYS

## 2025-06-24 ASSESSMENT — COLUMBIA-SUICIDE SEVERITY RATING SCALE - C-SSRS
2. HAVE YOU ACTUALLY HAD ANY THOUGHTS OF KILLING YOURSELF?: NO
1. WITHIN THE PAST MONTH, HAVE YOU WISHED YOU WERE DEAD OR WISHED YOU COULD GO TO SLEEP AND NOT WAKE UP?: NO
6. HAVE YOU EVER DONE ANYTHING, STARTED TO DO ANYTHING, OR PREPARED TO DO ANYTHING TO END YOUR LIFE?: NO

## 2025-06-24 ASSESSMENT — ENCOUNTER SYMPTOMS
DIARRHEA: 0
NAUSEA: 0
ABDOMINAL PAIN: 0
SHORTNESS OF BREATH: 0
SORE THROAT: 0
RHINORRHEA: 0
COUGH: 0
VOMITING: 0
WHEEZING: 0

## 2025-06-24 NOTE — PROGRESS NOTES
1. \"Have you been to the ER, urgent care clinic since your last visit?  Hospitalized since your last visit?\" Reviewed by PAWEL Zimmerman    2. \"Have you seen or consulted any other health care providers outside of the [facility] since your last visit?\" Reviewed by PAWEL Zimmerman   
Father     No Known Problems Sister     No Known Problems Brother     No Known Problems Maternal Aunt     No Known Problems Maternal Uncle     No Known Problems Paternal Aunt     No Known Problems Paternal Uncle     No Known Problems Maternal Grandmother     No Known Problems Maternal Grandfather     No Known Problems Paternal Grandmother     No Known Problems Paternal Grandfather     No Known Problems Other         Review of Systems   Constitutional:  Positive for fatigue. Negative for activity change, appetite change, chills, diaphoresis and fever.   HENT:  Negative for congestion, rhinorrhea and sore throat.    Eyes:  Negative for visual disturbance.   Respiratory:  Negative for cough, shortness of breath and wheezing.    Cardiovascular:  Positive for leg swelling (present but singificantly improved). Negative for chest pain and palpitations.   Gastrointestinal:  Negative for abdominal pain, diarrhea, nausea and vomiting.   Genitourinary:  Negative for flank pain.   Musculoskeletal:  Negative for myalgias.   Skin:  Negative for rash and wound.   Neurological:  Negative for dizziness, syncope, light-headedness and headaches.   Psychiatric/Behavioral:  Negative for agitation and confusion. The patient is not nervous/anxious.        Physical Exam  HENT:      Head: Normocephalic and atraumatic.      Mouth/Throat:      Mouth: Mucous membranes are moist.   Eyes:      Extraocular Movements: Extraocular movements intact.   Cardiovascular:      Rate and Rhythm: Normal rate and regular rhythm.      Pulses: Normal pulses.      Heart sounds: Murmur heard.      No friction rub. No gallop.   Pulmonary:      Effort: Pulmonary effort is normal. No respiratory distress.      Breath sounds: Normal breath sounds. No stridor. No rhonchi or rales.   Abdominal:      General: There is no distension.      Palpations: Abdomen is soft.      Tenderness: There is no abdominal tenderness. There is no guarding or rebound.   Musculoskeletal:

## 2025-07-07 ENCOUNTER — RESULTS FOLLOW-UP (OUTPATIENT)
Age: 60
End: 2025-07-07

## 2025-07-07 DIAGNOSIS — E87.6 HYPOKALEMIA: Primary | ICD-10-CM

## 2025-07-07 RX ORDER — POTASSIUM CHLORIDE 750 MG/1
30 TABLET, EXTENDED RELEASE ORAL DAILY
Qty: 270 TABLET | Refills: 3 | Status: SHIPPED | OUTPATIENT
Start: 2025-07-07

## 2025-07-07 NOTE — PROGRESS NOTES
Increased dose of potassium to take 20 mill equivalents in the morning and 10 mill equivalents at night along with repeat BMP in 1 week ordered

## 2025-08-25 ENCOUNTER — OFFICE VISIT (OUTPATIENT)
Age: 60
End: 2025-08-25
Payer: COMMERCIAL

## 2025-08-25 VITALS
HEART RATE: 75 BPM | DIASTOLIC BLOOD PRESSURE: 74 MMHG | SYSTOLIC BLOOD PRESSURE: 161 MMHG | BODY MASS INDEX: 38.88 KG/M2 | OXYGEN SATURATION: 98 % | HEIGHT: 71 IN

## 2025-08-25 DIAGNOSIS — I48.0 PAROXYSMAL ATRIAL FIBRILLATION (HCC): Primary | ICD-10-CM

## 2025-08-25 DIAGNOSIS — R94.31 ABNORMAL ECG: ICD-10-CM

## 2025-08-25 DIAGNOSIS — I50.22 CHRONIC SYSTOLIC CHF (CONGESTIVE HEART FAILURE) (HCC): ICD-10-CM

## 2025-08-25 DIAGNOSIS — E87.6 HYPOKALEMIA: ICD-10-CM

## 2025-08-25 DIAGNOSIS — I10 PRIMARY HYPERTENSION: ICD-10-CM

## 2025-08-25 DIAGNOSIS — I42.0 CONGESTIVE CARDIOMYOPATHY (HCC): ICD-10-CM

## 2025-08-25 DIAGNOSIS — I35.9 AORTIC VALVE DISEASE: ICD-10-CM

## 2025-08-25 DIAGNOSIS — E66.09 CLASS 2 OBESITY DUE TO EXCESS CALORIES WITHOUT SERIOUS COMORBIDITY WITH BODY MASS INDEX (BMI) OF 38.0 TO 38.9 IN ADULT: ICD-10-CM

## 2025-08-25 DIAGNOSIS — G47.33 OSA (OBSTRUCTIVE SLEEP APNEA): ICD-10-CM

## 2025-08-25 DIAGNOSIS — N18.32 STAGE 3B CHRONIC KIDNEY DISEASE (HCC): ICD-10-CM

## 2025-08-25 DIAGNOSIS — E66.812 CLASS 2 OBESITY DUE TO EXCESS CALORIES WITHOUT SERIOUS COMORBIDITY WITH BODY MASS INDEX (BMI) OF 38.0 TO 38.9 IN ADULT: ICD-10-CM

## 2025-08-25 DIAGNOSIS — I25.10 CORONARY ARTERY DISEASE INVOLVING NATIVE CORONARY ARTERY OF NATIVE HEART WITHOUT ANGINA PECTORIS: ICD-10-CM

## 2025-08-25 DIAGNOSIS — Z95.1 HX OF CABG: ICD-10-CM

## 2025-08-25 PROCEDURE — 3077F SYST BP >= 140 MM HG: CPT | Performed by: INTERNAL MEDICINE

## 2025-08-25 PROCEDURE — 99215 OFFICE O/P EST HI 40 MIN: CPT | Performed by: INTERNAL MEDICINE

## 2025-08-25 PROCEDURE — 3078F DIAST BP <80 MM HG: CPT | Performed by: INTERNAL MEDICINE

## 2025-08-25 ASSESSMENT — LIFESTYLE VARIABLES
HOW MANY STANDARD DRINKS CONTAINING ALCOHOL DO YOU HAVE ON A TYPICAL DAY: PATIENT DOES NOT DRINK
HOW OFTEN DO YOU HAVE A DRINK CONTAINING ALCOHOL: NEVER

## 2025-08-25 ASSESSMENT — ENCOUNTER SYMPTOMS
ALLERGIC/IMMUNOLOGIC NEGATIVE: 1
GASTROINTESTINAL NEGATIVE: 1
SHORTNESS OF BREATH: 0
EYES NEGATIVE: 1

## 2025-08-25 ASSESSMENT — PATIENT HEALTH QUESTIONNAIRE - PHQ9
SUM OF ALL RESPONSES TO PHQ QUESTIONS 1-9: 0
1. LITTLE INTEREST OR PLEASURE IN DOING THINGS: NOT AT ALL
SUM OF ALL RESPONSES TO PHQ QUESTIONS 1-9: 0
DEPRESSION UNABLE TO ASSESS: PT REFUSES
2. FEELING DOWN, DEPRESSED OR HOPELESS: NOT AT ALL

## (undated) DEVICE — RADIFOCUS OPTITORQUE ANGIOGRAPHIC CATHETER: Brand: OPTITORQUE

## (undated) DEVICE — SET FLD ADMIN 3 W STPCOCK FIX FEM L BOR 1IN

## (undated) DEVICE — CATH BLLN ANGIO 3X12MM NC EUPHORIA RX

## (undated) DEVICE — CATHETER GUID JR4 0.070 INX6 FRX100 CM VISTA BRT TIP ADROIT

## (undated) DEVICE — Device: Brand: D-STAT® RADIAL TOPICAL HEMOSTAT

## (undated) DEVICE — PROCEDURE KIT FLUID MGMT 10 FR CUST MAINFOLD

## (undated) DEVICE — HI-TORQUE BALANCE GUIDE WIRE W/HYDROCOAT .014 STRAIGHT TIP 3.0 CM X 190 CM: Brand: HI-TORQUE BALANCE

## (undated) DEVICE — CATH BLLN ANGIO 3X12MM SC EUPHORA RX

## (undated) DEVICE — DEVICE INFLATION ANALG 35 ATM 30 ML PHD BASIXTOUCH IN8852

## (undated) DEVICE — DRAPE,ANGIO,BRACH,STERILE,38X44: Brand: MEDLINE

## (undated) DEVICE — CATHETER DIAG SM AD L100CM DIA6FR JUDKINS L 3.5 POLYUR COR

## (undated) DEVICE — CATHETER GUID 6FR L150CM RAP EXCHG L25CM TIP 5.1FR PUSHROD

## (undated) DEVICE — PACK PROCEDURE SURG VASC CATH 161 MMC LF

## (undated) DEVICE — CATHETER GUID 6FR L100CM GRN PTFE AL0.75 TRUELUMEN HYBRID

## (undated) DEVICE — CATH BLLN ANGIO 2.50X15MM SC EUPHORA RX

## (undated) DEVICE — GUIDEWIRE VASC L260CM DIA0035IN TIP L3MM PTFE J STD TAPR FIX

## (undated) DEVICE — PRESSURE MONITORING SET: Brand: TRUWAVE

## (undated) DEVICE — Device

## (undated) DEVICE — GLIDESHEATH SLENDER STAINLESS STEEL KIT: Brand: GLIDESHEATH SLENDER